# Patient Record
Sex: MALE | Race: WHITE | NOT HISPANIC OR LATINO | Employment: OTHER | URBAN - METROPOLITAN AREA
[De-identification: names, ages, dates, MRNs, and addresses within clinical notes are randomized per-mention and may not be internally consistent; named-entity substitution may affect disease eponyms.]

---

## 2017-01-16 ENCOUNTER — TELEPHONE (OUTPATIENT)
Dept: SMOKING CESSATION | Facility: CLINIC | Age: 62
End: 2017-01-16

## 2017-01-31 ENCOUNTER — CLINICAL SUPPORT (OUTPATIENT)
Dept: SMOKING CESSATION | Facility: CLINIC | Age: 62
End: 2017-01-31
Payer: COMMERCIAL

## 2017-01-31 DIAGNOSIS — F17.200 NICOTINE DEPENDENCE: Primary | ICD-10-CM

## 2017-01-31 PROCEDURE — 99999 PR PBB SHADOW E&M-EST. PATIENT-LVL I: CPT | Mod: PBBFAC,,,

## 2017-01-31 PROCEDURE — 99404 PREV MED CNSL INDIV APPRX 60: CPT | Mod: S$GLB,,,

## 2017-01-31 RX ORDER — VARENICLINE TARTRATE 0.5 (11)-1
KIT ORAL
Qty: 1 PACKAGE | Refills: 0 | Status: SHIPPED | OUTPATIENT
Start: 2017-01-31 | End: 2017-02-16 | Stop reason: SDUPTHER

## 2017-02-05 RX ORDER — SIMVASTATIN 20 MG/1
TABLET, FILM COATED ORAL
Qty: 90 TABLET | Refills: 0 | Status: SHIPPED | OUTPATIENT
Start: 2017-02-05 | End: 2017-02-07 | Stop reason: SDUPTHER

## 2017-02-07 ENCOUNTER — OFFICE VISIT (OUTPATIENT)
Dept: FAMILY MEDICINE | Facility: CLINIC | Age: 62
End: 2017-02-07
Payer: COMMERCIAL

## 2017-02-07 VITALS
HEART RATE: 93 BPM | SYSTOLIC BLOOD PRESSURE: 124 MMHG | BODY MASS INDEX: 29.63 KG/M2 | HEIGHT: 71 IN | WEIGHT: 211.63 LBS | TEMPERATURE: 98 F | DIASTOLIC BLOOD PRESSURE: 78 MMHG | OXYGEN SATURATION: 96 %

## 2017-02-07 DIAGNOSIS — F17.200 SMOKER: ICD-10-CM

## 2017-02-07 DIAGNOSIS — I10 ESSENTIAL HYPERTENSION: Primary | ICD-10-CM

## 2017-02-07 DIAGNOSIS — E78.5 HYPERLIPIDEMIA, UNSPECIFIED HYPERLIPIDEMIA TYPE: ICD-10-CM

## 2017-02-07 PROCEDURE — 99999 PR PBB SHADOW E&M-EST. PATIENT-LVL III: CPT | Mod: PBBFAC,,, | Performed by: NURSE PRACTITIONER

## 2017-02-07 PROCEDURE — 3078F DIAST BP <80 MM HG: CPT | Mod: S$GLB,,, | Performed by: NURSE PRACTITIONER

## 2017-02-07 PROCEDURE — 99213 OFFICE O/P EST LOW 20 MIN: CPT | Mod: S$GLB,,, | Performed by: NURSE PRACTITIONER

## 2017-02-07 PROCEDURE — 3074F SYST BP LT 130 MM HG: CPT | Mod: S$GLB,,, | Performed by: NURSE PRACTITIONER

## 2017-02-07 RX ORDER — SIMVASTATIN 20 MG/1
20 TABLET, FILM COATED ORAL NIGHTLY
Qty: 90 TABLET | Refills: 1 | Status: SHIPPED | OUTPATIENT
Start: 2017-02-07 | End: 2017-06-08 | Stop reason: SDUPTHER

## 2017-02-07 RX ORDER — LOSARTAN POTASSIUM 100 MG/1
100 TABLET ORAL DAILY
Qty: 30 TABLET | Refills: 3 | Status: SHIPPED | OUTPATIENT
Start: 2017-02-07 | End: 2017-06-08 | Stop reason: SDUPTHER

## 2017-02-07 RX ORDER — ALBUTEROL SULFATE 90 UG/1
2 AEROSOL, METERED RESPIRATORY (INHALATION) EVERY 6 HOURS PRN
Qty: 8 G | Refills: 0 | Status: SHIPPED | OUTPATIENT
Start: 2017-02-07 | End: 2018-06-19

## 2017-02-07 NOTE — PROGRESS NOTES
This dictation has been generated using Dragon Dictation some phonetic errors may occur.     Ruben was seen today for hypertension and medication refill.    Diagnoses and all orders for this visit:    Essential hypertension  -     Comprehensive metabolic panel; Future  -     Lipid panel; Future  -     Urinalysis; Future    Hyperlipidemia, unspecified hyperlipidemia type    Smoker  -     CT Chest Lung Screening Low Dose; Future    Other orders  -     simvastatin (ZOCOR) 20 MG tablet; Take 1 tablet (20 mg total) by mouth every evening.  -     losartan (COZAAR) 100 MG tablet; Take 1 tablet (100 mg total) by mouth once daily.  -     albuterol 90 mcg/actuation inhaler; Inhale 2 puffs into the lungs every 6 (six) hours as needed for Wheezing or Shortness of Breath.      Patient Active Problem List    Diagnosis Date Noted    Smoker 02/07/2017    Hypertension 09/19/2012    Hyperlipidemia 09/19/2012    HTN (hypertension) 08/06/2012     Stable and controlled on ARB.       hypertension refill meds as above.  I will review and address accordingly.  Hyperlipidemia stable and controlled continue current therapy.  Refill med as above.  Smoker schedule risk factors with patient.  He understands that there might be a charge and would like to know how much.    Return in about 6 months (around 8/7/2017).      ________________________________________________________________  ________________________________________________________________        Chief Complaint   Patient presents with    Hypertension    Medication Refill     History of present illness  This 62 y.o. presents today for complaint of hypertension hyperlipidemia and smoker.  Patient is trying to quit smoking again.  He is currently on Chantix.  His blood pressure is been controlled.  Patient denies any side effects of the medicine.  No side effects to cholesterol medicine.  Patient does note wheezing.  Symptoms are present at night.  He notes a smoker cough.   Probably starting with some early COPD.  Also needs rule out of cancer due to history of smoking.  Review of systems  No fever chills or unintended weight changes or night sweats noted.  Patient denies chest pain or shortness of breath.  He does note wheezing.  Wheezing is at night.  Has not used med for symptoms.  No polyuria polydipsia  No rash or abnormal bruising noted    Past medical and social history reviewed.    Past Medical History   Diagnosis Date    Hyperlipidemia     Hypertension        Past Surgical History   Procedure Laterality Date    Appendectomy         Family History   Problem Relation Age of Onset    Heart disease Mother     Hyperlipidemia Mother     Rashes / Skin problems Mother        Social History     Social History    Marital status:      Spouse name: N/A    Number of children: N/A    Years of education: N/A     Social History Main Topics    Smoking status: Former Smoker     Packs/day: 1.50     Years: 50.00     Types: Cigarettes     Quit date: 3/10/2015    Smokeless tobacco: Current User    Alcohol use 0.0 oz/week     0 Standard drinks or equivalent per week    Drug use: None    Sexual activity: Yes     Partners: Female     Other Topics Concern    None     Social History Narrative       Current Outpatient Prescriptions   Medication Sig Dispense Refill    ketoconazole (NIZORAL) 2 % cream 2 %.  Cream Topical Every day      losartan (COZAAR) 100 MG tablet Take 1 tablet (100 mg total) by mouth once daily. 30 tablet 3    nicotine (NICODERM CQ) 7 mg/24 hr Place 1 patch onto the skin once daily. 14 patch 0    nicotine (NICOTROL) 10 mg Crtg Inhale 1 puff into the lungs as needed (1 puff, as needed). 168 each 0    sildenafil (VIAGRA) 100 MG tablet Take 1 tablet (100 mg total) by mouth daily as needed for Erectile Dysfunction. 10 tablet 3    simvastatin (ZOCOR) 20 MG tablet Take 1 tablet (20 mg total) by mouth every evening. 90 tablet 1    varenicline (CHANTIX STARTING  MONTH BOX) 0.5 mg (11)- 1 mg (42) tablet Take one 0.5mg tab by mouth once daily X3 days,then increase to one 0.5mg tab twice daily X4 days,then increase to one 1mg tab twice daily 1 Package 0    albuterol 90 mcg/actuation inhaler Inhale 2 puffs into the lungs every 6 (six) hours as needed for Wheezing or Shortness of Breath. 8 g 0    paroxetine (PAXIL) 20 MG tablet Take 1 tablet (20 mg total) by mouth once daily. 30 tablet 6     No current facility-administered medications for this visit.        Review of patient's allergies indicates:   Allergen Reactions    Penicillins      Other reaction(s): Hives,itching       Physical examination  Vitals Reviewed  Gen. Well-dressed well-nourished no apparent distress  Skin warm dry and intact.  No rashes noted.  HEENT.  TM intact bilateral with normal light reflex.  No mastoid tenderness during percussion.  Nares patent bilateral.  Pharynx is unremarkable.  No maxillary or frontal sinus tenderness when percussed.    Neck is supple without adenopathy  Chest.  Respirations are even unlabored.  Lungs are clear to auscultation.  Cardiac regular rate and rhythm.  No chest wall adenopathy noted.  Neuro. Awake alert oriented x4.  Normal judgment and cognition noted.  Extremities no clubbing cyanosis or edema noted.     Call or return to clinic prn if these symptoms worsen or fail to improve as anticipated.

## 2017-02-07 NOTE — MR AVS SNAPSHOT
[facility]  [address] [place] [address]  Phone: [phone]  Fax: [phone]                  Ruben Cardenas   2017 10:40 AM   Office Visit    Description:  Male : 1955   Provider:  Wilbert Mcclellan NP   Department:  [facility]           Reason for Visit     Hypertension     Medication Refill           Diagnoses this Visit        Comments    Essential hypertension    -  Primary     Hyperlipidemia, unspecified hyperlipidemia type         Smoker                To Do List           Future Appointments        Provider Department Dept Phone    2017 7:00 AM LAB, [place] [phone]    2017 7:15 AM LAB, [place] [facility] [phone]      Goals (5 Years of Data)     None      Follow-Up and Disposition     Return in about 6 months (around 2017).       These Medications        Disp Refills Start End    simvastatin (ZOCOR) 20 MG tablet 90 tablet 1 2017     Take 1 tablet (20 mg total) by mouth every evening. - Oral    Pharmacy: [facility] [address] [place] [facility] Ph #: [phone]       losartan (COZAAR) 100 MG tablet 30 tablet 3 2017     Take 1 tablet (100 mg total) by mouth once daily. - Oral    Pharmacy: [facility] [address] [place] [facility] Ph #: [phone]       albuterol 90 mcg/actuation inhaler 8 g 0 2017    Inhale 2 puffs into the lungs every 6 (six) hours as needed for Wheezing or Shortness of Breath. - Inhalation    Pharmacy: [facility] [address] [place] [facility] Ph #: [phone]         [facility] On Call     [facility] On Call Nurse Care Line -  Assistance  Registered nurses in the [facility] On Call Center provide clinical advisement, health education, appointment booking, and other advisory  services.  Call for this free service at [phone].             Medications           Message regarding Medications     Verify the changes and/or additions to your medication regime listed below are the same as discussed with your clinician today.  If any of these changes or additions are incorrect, please notify your healthcare provider.        START taking these NEW medications        Refills    albuterol 90 mcg/actuation inhaler 0    Sig: Inhale 2 puffs into the lungs every 6 (six) hours as needed for Wheezing or Shortness of Breath.    Class: Normal    Route: Inhalation      CHANGE how you are taking these medications     Start Taking Instead of    simvastatin (ZOCOR) 20 MG tablet simvastatin (ZOCOR) 20 MG tablet    Dosage:  Take 1 tablet (20 mg total) by mouth every evening. Dosage:  TAKE 1 TABLET BY MOUTH EVERY EVENING    Reason for Change:  Reorder     losartan (COZAAR) 100 MG tablet losartan (COZAAR) 100 MG tablet    Dosage:  Take 1 tablet (100 mg total) by mouth once daily. Dosage:  TAKE 1 TABLET BY MOUTH EVERY DAY    Reason for Change:  Reorder            Verify that the below list of medications is an accurate representation of the medications you are currently taking.  If none reported, the list may be blank. If incorrect, please contact your healthcare provider. Carry this list with you in case of emergency.           Current Medications     ketoconazole (NIZORAL) 2 % cream 2 %.  Cream Topical Every day    losartan (COZAAR) 100 MG tablet Take 1 tablet (100 mg total) by mouth once daily.    nicotine (NICODERM CQ) 7 mg/24 hr Place 1 patch onto the skin once daily.    nicotine (NICOTROL) 10 mg Crtg Inhale 1 puff into the lungs as needed (1 puff, as needed).    sildenafil (VIAGRA) 100 MG tablet Take 1 tablet (100 mg total) by mouth daily as needed for Erectile Dysfunction.    simvastatin (ZOCOR) 20 MG tablet Take 1 tablet (20 mg total) by mouth every evening.    varenicline (CHANTIX STARTING MONTH BOX)  "0.5 mg (11)- 1 mg (42) tablet Take one 0.5mg tab by mouth once daily X3 days,then increase to one 0.5mg tab twice daily X4 days,then increase to one 1mg tab twice daily    albuterol 90 mcg/actuation inhaler Inhale 2 puffs into the lungs every 6 (six) hours as needed for Wheezing or Shortness of Breath.    paroxetine (PAXIL) 20 MG tablet Take 1 tablet (20 mg total) by mouth once daily.           Clinical Reference Information           Your Vitals Were     BP Pulse Temp Height Weight SpO2    124/78 (BP Location: Right arm, Patient Position: Sitting, BP Method: Manual) 93 98 °F (36.7 °C) (Oral) 5' 11" (1.803 m) 96 kg (211 lb 10.3 oz) 96%    BMI                29.52 kg/m2          Blood Pressure          Most Recent Value    BP  124/78      Allergies as of 2/7/2017     Penicillins      Immunizations Administered on Date of Encounter - 2/7/2017     None      Orders Placed During Today's Visit     Future Labs/Procedures Expected by Expires    Comprehensive metabolic panel  2/7/2017 5/8/2017    CT Chest Lung Screening Low Dose  2/7/2017 2/7/2018    Lipid panel  2/7/2017 5/8/2017    Urinalysis  2/7/2017 4/8/2018      [facility] Sign-Up     Activating your [facility] account is as easy as 1-2-3!     1) Visit [email or web address], select Sign Up Now, enter this activation code and your date of birth, then select Next.  [email or web address]  Expires: 3/24/2017 11:12 AM      2) Create a username and password to use when you visit [facility] in the future and select a security question in case you lose your password and select Next.    3) Enter your e-mail address and click Sign Up!    Additional Information  If you have questions, please e-mail [email or web address] or call [phone] to talk to our [facility] staff. Remember, [facility] is NOT to be used for urgent needs. For medical emergencies, dial 911.         Language Assistance Services     ATTENTION: Language assistance services are available, free of charge. Please call " [phone].      ATENCIÓN: Si habla español, tiene a lal disposición servicios gratuitos de asistencia lingüística. Llame al [phone].     CHÚ Ý: N?u b?n nói Ti?ng Vi?t, có các d?ch v? h? tr? ngôn ng? mi?n phí dành cho b?n. G?i s? [phone].         [facility] complies with applicable Federal civil rights laws and does not discriminate on the basis of race, color, national origin, age, disability, or sex.

## 2017-02-08 ENCOUNTER — LAB VISIT (OUTPATIENT)
Dept: LAB | Facility: HOSPITAL | Age: 62
End: 2017-02-08
Attending: NURSE PRACTITIONER
Payer: COMMERCIAL

## 2017-02-08 ENCOUNTER — TELEPHONE (OUTPATIENT)
Dept: FAMILY MEDICINE | Facility: CLINIC | Age: 62
End: 2017-02-08

## 2017-02-08 DIAGNOSIS — I10 ESSENTIAL HYPERTENSION: ICD-10-CM

## 2017-02-14 ENCOUNTER — CLINICAL SUPPORT (OUTPATIENT)
Dept: SMOKING CESSATION | Facility: CLINIC | Age: 62
End: 2017-02-14
Payer: COMMERCIAL

## 2017-02-14 DIAGNOSIS — F17.200 NICOTINE DEPENDENCE: Primary | ICD-10-CM

## 2017-02-14 PROCEDURE — 99406 BEHAV CHNG SMOKING 3-10 MIN: CPT | Mod: S$GLB,,,

## 2017-02-16 ENCOUNTER — CLINICAL SUPPORT (OUTPATIENT)
Dept: SMOKING CESSATION | Facility: CLINIC | Age: 62
End: 2017-02-16
Payer: COMMERCIAL

## 2017-02-16 DIAGNOSIS — F17.200 NICOTINE DEPENDENCE: ICD-10-CM

## 2017-02-16 PROCEDURE — 99999 PR PBB SHADOW E&M-EST. PATIENT-LVL I: CPT | Mod: PBBFAC,,,

## 2017-02-16 PROCEDURE — 99402 PREV MED CNSL INDIV APPRX 30: CPT | Mod: S$GLB,,,

## 2017-02-16 RX ORDER — VARENICLINE TARTRATE 1 MG/1
1 TABLET, FILM COATED ORAL 2 TIMES DAILY
Qty: 56 TABLET | Refills: 0 | Status: SHIPPED | OUTPATIENT
Start: 2017-02-16 | End: 2017-03-16

## 2017-02-16 RX ORDER — VARENICLINE TARTRATE 0.5 (11)-1
KIT ORAL
Qty: 1 PACKAGE | Refills: 0 | Status: SHIPPED | OUTPATIENT
Start: 2017-02-16 | End: 2017-02-16 | Stop reason: CLARIF

## 2017-02-16 NOTE — PROGRESS NOTES
Individual Follow-Up Form    2/16/2017    Quit Date: 2/2/17    Clinical Status of Patient: Outpatient    Length of Service: 30 minutes    Continuing Medication: yes  Chantix    Other Medications: n/a     Target Symptoms: Withdrawal and medication side effects. The following were  rated moderate (3) to severe (4) on TCRS:  · Moderate (3): 0  · Severe (4): 0    Comments: pt presents as a non smoker, pt quit 2/2 and has not had any slips, he is currently on chantix po bid and doing well, will have patient continue chantix x 2 more months following starter pack to prevent lapse, he is fighting the urge and doing well, will continue to follow and monitor while taking chantix, session # 1 handout provided to the patient and discussed, contact card provided to the patient     Diagnosis: F17.210    Next Visit: 2 weeks

## 2017-02-16 NOTE — Clinical Note
pt presents as a non smoker, pt quit 2/2 and has not had any slips, he is currently on chantix po bid and doing well, will have patient continue chantix x 2 more months following starter pack to prevent lapse, he is fighting the urge and doing well, will continue to follow and monitor while taking chantix

## 2017-05-05 RX ORDER — SIMVASTATIN 20 MG/1
TABLET, FILM COATED ORAL
Qty: 90 TABLET | Refills: 0 | Status: SHIPPED | OUTPATIENT
Start: 2017-05-05 | End: 2017-06-08 | Stop reason: SDUPTHER

## 2017-06-08 ENCOUNTER — OFFICE VISIT (OUTPATIENT)
Dept: FAMILY MEDICINE | Facility: CLINIC | Age: 62
End: 2017-06-08
Payer: COMMERCIAL

## 2017-06-08 VITALS
HEIGHT: 71 IN | HEART RATE: 100 BPM | TEMPERATURE: 98 F | BODY MASS INDEX: 30.14 KG/M2 | WEIGHT: 215.25 LBS | OXYGEN SATURATION: 98 % | DIASTOLIC BLOOD PRESSURE: 76 MMHG | SYSTOLIC BLOOD PRESSURE: 114 MMHG

## 2017-06-08 DIAGNOSIS — E78.5 HYPERLIPIDEMIA, UNSPECIFIED HYPERLIPIDEMIA TYPE: ICD-10-CM

## 2017-06-08 DIAGNOSIS — R73.09 ABNORMAL GLUCOSE: ICD-10-CM

## 2017-06-08 DIAGNOSIS — Z23 NEED FOR SHINGLES VACCINE: ICD-10-CM

## 2017-06-08 DIAGNOSIS — J30.9 ALLERGIC RHINITIS, UNSPECIFIED ALLERGIC RHINITIS TRIGGER, UNSPECIFIED RHINITIS SEASONALITY: ICD-10-CM

## 2017-06-08 DIAGNOSIS — Z12.2 ENCOUNTER FOR SCREENING FOR LUNG CANCER: ICD-10-CM

## 2017-06-08 DIAGNOSIS — I10 ESSENTIAL HYPERTENSION: Primary | ICD-10-CM

## 2017-06-08 PROCEDURE — 99999 PR PBB SHADOW E&M-EST. PATIENT-LVL III: CPT | Mod: PBBFAC,,, | Performed by: INTERNAL MEDICINE

## 2017-06-08 PROCEDURE — 99214 OFFICE O/P EST MOD 30 MIN: CPT | Mod: S$GLB,,, | Performed by: INTERNAL MEDICINE

## 2017-06-08 RX ORDER — FLUTICASONE PROPIONATE 50 MCG
1 SPRAY, SUSPENSION (ML) NASAL DAILY
Qty: 1 BOTTLE | Refills: 11 | Status: SHIPPED | OUTPATIENT
Start: 2017-06-08 | End: 2018-06-19

## 2017-06-08 RX ORDER — SIMVASTATIN 20 MG/1
20 TABLET, FILM COATED ORAL NIGHTLY
Qty: 90 TABLET | Refills: 3 | Status: SHIPPED | OUTPATIENT
Start: 2017-06-08 | End: 2018-04-26 | Stop reason: SDUPTHER

## 2017-06-08 RX ORDER — LOSARTAN POTASSIUM 100 MG/1
100 TABLET ORAL DAILY
Qty: 90 TABLET | Refills: 3 | Status: SHIPPED | OUTPATIENT
Start: 2017-06-08 | End: 2018-04-26 | Stop reason: SDUPTHER

## 2017-06-08 NOTE — PATIENT INSTRUCTIONS
A1C  Does this test have other names?  Hemoglobin A1c; HbA1c; glycosylated hemoglobin; glycohemoglobin; Glycated hemoglobin  What is this test?  A1C is a blood test used to screen people to find out whether they have diabetes or prediabetes. It's also used in people who know they have diabetes to measure how well they are controlling their blood sugar and to guide their treatment decisions over time.  Why do I need this test?  You may need this test to check for prediabetes or diabetes. If you already know that you have diabetes or prediabetes, you may need this test to see how well you are controlling your blood sugar.  People with diabetes must track their blood sugar (glucose) levels every day to make sure they arent too high or too low. The A1C test gives results for a longer period of time. It shows whether your blood sugar has been too high on average in the previous two to three months. When blood sugar is high, more glucose builds up and sticks to your hemoglobin, a protein that carries oxygen in red blood cells. The A1C test measures the percentage of hemoglobin that is coated with blood sugar.  Depending on the type of diabetes you have, how well it's controlled, and your health care providers preferences, you may need to have the A1C test two or more times a year. The American Diabetes Association (ADA) recommends that you have an A1C test at least twice a year if you are meeting your blood sugar goals and your blood sugar is well-controlled. If you arent meeting your goals or your medication has changed lately, you should have the A1C test more often. You also may have the test when your health care provider first starts working with you to treat your diabetes.  What other tests might I have along with this test?   If your health care provider is testing you for diabetes, you may also take a fasting plasma glucose test, or FPG, or an oral glucose tolerance test, or OGTT, as part of your screening  and diagnosis. You may also be tested for sugar, ketones or protein in the urine.  What do my test results mean?  Laboratory test results may vary depending on your age, gender, health history, the method used for the test, and many other factors. If your results are different from the results suggested below, this may not mean that you have a problem. Ask your health care provider to explain what the results mean for you.   A1C is reported as a percentage:  · Normal A1C is considered to be below 5.7 percent. Results between 5.7 and 6.4 percent may mean you have prediabetes. This means you have a higher risk of developing diabetes.  · Results of 6.5 percent or higher on two separate occasions may mean that you have diabetes.  · The ADA  recommends that people with diabetes should maintain an A1C below 7 percent. The American Association of Clinical Endocrinologists recommends an A1C of 6.5 percent or less. Recommendations may vary based on the person's age, medical conditions, or other factors.   How is this test done?  The test requires a blood sample, which is drawn through a needle from a vein in your arm.   Does this test pose any risks?  Taking a blood sample with a needle carries risks that include bleeding, infection, bruising, and a sense of lightheadedness. When the needle pricks your arm, you may feel a slight stinging sensation or pain. Afterward, the site may be slightly sore.  What might affect my test results?  Your blood sugar levels usually vary throughout the day. These variations won't affect the A1C.  If you have sickle cell anemia or other blood disorders, a standard A1C test may be less useful for diagnosing or monitoring diabetes. (Your health care provider may be able to find another diabetes test that will better serve you.) In addition, the test results may be skewed if you have anemia, heavy bleeding, an iron deficiency, kidney failure, or liver disease.  How do I get ready for this  test?  You don't need any special preparation for the test.    Date Last Reviewed: 5/15/2015  © 6092-1374 [employer]. [address] [place] [address], [place] [address]. All rights reserved. This information is not intended as a substitute for professional medical care. Always follow your healthcare professional's instructions.

## 2017-06-08 NOTE — PROGRESS NOTES
Assessment & Plan  Essential hypertension - stable, refilled losartan  -     losartan (COZAAR) 100 MG tablet; Take 1 tablet (100 mg total) by mouth once daily.  Dispense: 90 tablet; Refill: 3    Encounter for screening for lung cancer-former smoker, less than 2 months.  Previously ordered CT chest, reorder today  -     CT Chest Lung Screening Low Dose; Future; Expected date: 06/08/2017    Hyperlipidemia, unspecified hyperlipidemia type-out of simvastatin for the past week, refilled  -     simvastatin (ZOCOR) 20 MG tablet; Take 1 tablet (20 mg total) by mouth every evening.  Dispense: 90 tablet; Refill: 3    Abnormal glucose-work on diet and exercise.  Gave him a handout about prediabetes.    Allergic rhinitis, unspecified allergic rhinitis trigger, unspecified rhinitis seasonality-refilled Flonase  -     fluticasone (FLONASE) 50 mcg/actuation nasal spray; 1 spray by Each Nare route once daily.  Dispense: 1 Bottle; Refill: 11    Need for shingles vaccine  -     zoster vaccine live, PF, (ZOSTAVAX, PF,) 19,400 unit/0.65 mL injection; Inject 19,400 Units into the skin once.  Dispense: 1 vial; Refill: 0    History of depression, no longer requiring Paxil.    Medications Discontinued During This Encounter   Medication Reason    nicotine (NICODERM CQ) 7 mg/24 hr Therapy completed    nicotine (NICOTROL) 10 mg Crtg Therapy completed    paroxetine (PAXIL) 20 MG tablet Therapy not effective    simvastatin (ZOCOR) 20 MG tablet Duplicate Order    sildenafil (VIAGRA) 100 MG tablet Therapy completed    ketoconazole (NIZORAL) 2 % cream Therapy completed       Follow-up: No Follow-up on file. fasting follow-up 6 months, A1c, lipid.  Recall entered      =================================================================      Chief Complaint   Patient presents with    Medication Refill       SERGE Miranda is a 62 y.o. male, last appointment with this clinic was 2/7/2017.    HTN; has home cuff.  Cannot recall numbers - wife checks.   Notes pressure higher in the morning.   Hyperlipidemia. Ran out of the simva x 1 week.   Smoker - quit 2 months ago!  Shola Mccllelan had ordered CT lung screening 2/2017.  Hx of chantix.  Hx of enrollment smoking cessation classes. Intermittent use of inhaler.    Hx of depression, paxil.  Stopped about 2 years ago.  Colonoscopy due 2019 by report.  Pre-DM with hx of a1c 6.0.    Never got the lung CT scan - was too expensive; however he's changed insurance companies.  Recalls having it done at a Clinic in [place] but unclear if it's CXR or CT scan.    Eats a lot of rice.  Not a sweet tooth.    Physical activity - with work.    Patient Care Team:  Charles Hwang MD as PCP - General (Internal Medicine)    Patient Active Problem List    Diagnosis Date Noted    Smoker 02/07/2017    Hypertension 09/19/2012    Hyperlipidemia 09/19/2012    HTN (hypertension) 08/06/2012     Stable and controlled on ARB.          PAST MEDICAL HISTORY:  Past Medical History:   Diagnosis Date    Hyperlipidemia     Hypertension        PAST SURGICAL HISTORY:  Past Surgical History:   Procedure Laterality Date    APPENDECTOMY       Family History   Problem Relation Age of Onset    Heart disease Mother     Hyperlipidemia Mother     Rashes / Skin problems Mother     COPD Sister      smoker    Alcohol abuse Brother     Hypertension Sister     No Known Problems Sister     No Known Problems Son     No Known Problems Daughter        SOCIAL HISTORY:  Social History     Social History    Marital status:      Spouse name: N/A    Number of children: N/A    Years of education: N/A     Occupational History    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  Review of patient's allergies indicates:   Allergen Reactions    Penicillins      Other reaction(s): Hives,itching     Current Outpatient Prescriptions   Medication Sig Dispense Refill    losartan (COZAAR) 100 MG tablet Take 1 tablet (100 mg total) by mouth once daily. 30 tablet 3    simvastatin (ZOCOR) 20 MG tablet Take 1 tablet (20 mg total) by mouth every evening. 90 tablet 1    albuterol 90 mcg/actuation inhaler Inhale 2 puffs into the lungs every 6 (six) hours as needed for Wheezing or Shortness of Breath. 8 g 0     No current facility-administered medications for this visit.        Review of Systems   Constitutional: Negative for chills and fever.   Respiratory: Negative for cough and shortness of breath.    Cardiovascular: Negative for chest pain and leg swelling.       Physical Exam   Vitals:    06/08/17 1343   BP: 114/76   Pulse: 100   Temp: 98 °F (36.7 °C)   SpO2: 98%   Weight: 97.7 kg (215 lb 4.5 oz)   Height: 5' 11" (1.803 m)    Body mass index is 30.03 kg/m².  Weight: 97.7 kg (215 lb 4.5 oz)   Height: 5' 11" (180.3 cm)     Physical Exam   Constitutional: He is oriented to person, place, and time. He appears well-developed and well-nourished. No distress.   Eyes: EOM are normal.   Cardiovascular: Normal rate, regular rhythm and normal heart sounds.    No murmur heard.  Pulmonary/Chest: Effort normal and breath sounds normal.   Musculoskeletal: Normal range of motion.   Neurological: He is alert and oriented to person, place, and time. Coordination normal.   Skin: Skin is warm and dry.   Psychiatric: He has a normal mood and affect. His behavior is normal. Thought content normal.     "

## 2017-07-19 ENCOUNTER — CLINICAL SUPPORT (OUTPATIENT)
Dept: SMOKING CESSATION | Facility: CLINIC | Age: 62
End: 2017-07-19
Payer: COMMERCIAL

## 2017-07-19 DIAGNOSIS — F17.200 NICOTINE DEPENDENCE: Primary | ICD-10-CM

## 2017-07-19 PROCEDURE — 99407 BEHAV CHNG SMOKING > 10 MIN: CPT | Mod: S$GLB,,,

## 2018-01-11 ENCOUNTER — CLINICAL SUPPORT (OUTPATIENT)
Dept: SMOKING CESSATION | Facility: CLINIC | Age: 63
End: 2018-01-11
Payer: COMMERCIAL

## 2018-01-11 DIAGNOSIS — F17.200 NICOTINE DEPENDENCE: Primary | ICD-10-CM

## 2018-01-11 PROCEDURE — 99407 BEHAV CHNG SMOKING > 10 MIN: CPT | Mod: S$GLB,,,

## 2018-01-22 ENCOUNTER — CLINICAL SUPPORT (OUTPATIENT)
Dept: SMOKING CESSATION | Facility: CLINIC | Age: 63
End: 2018-01-22
Payer: COMMERCIAL

## 2018-01-22 DIAGNOSIS — F17.200 NICOTINE DEPENDENCE: Primary | ICD-10-CM

## 2018-01-22 PROCEDURE — 99999 PR PBB SHADOW E&M-EST. PATIENT-LVL I: CPT | Mod: PBBFAC,,,

## 2018-01-22 PROCEDURE — 99404 PREV MED CNSL INDIV APPRX 60: CPT | Mod: S$GLB,,,

## 2018-01-22 RX ORDER — VARENICLINE TARTRATE 0.5 (11)-1
KIT ORAL
Qty: 1 PACKAGE | Refills: 0 | Status: SHIPPED | OUTPATIENT
Start: 2018-01-22 | End: 2018-06-19

## 2018-01-22 RX ORDER — IBUPROFEN 200 MG
1 TABLET ORAL DAILY
Qty: 14 PATCH | Refills: 0 | Status: SHIPPED | OUTPATIENT
Start: 2018-01-22 | End: 2018-02-22

## 2018-03-16 DIAGNOSIS — Z13.6 SCREENING FOR AAA (AORTIC ABDOMINAL ANEURYSM): ICD-10-CM

## 2018-04-11 ENCOUNTER — CLINICAL SUPPORT (OUTPATIENT)
Dept: SMOKING CESSATION | Facility: CLINIC | Age: 63
End: 2018-04-11
Payer: COMMERCIAL

## 2018-04-11 DIAGNOSIS — F17.200 NICOTINE DEPENDENCE: Primary | ICD-10-CM

## 2018-04-11 PROCEDURE — 99407 BEHAV CHNG SMOKING > 10 MIN: CPT | Mod: S$GLB,,, | Performed by: INTERNAL MEDICINE

## 2018-04-11 NOTE — PROGRESS NOTES
Spoke with patient today regarding smoking cessation, he states not tobacco free. Patient states he changed insurance and they won't pay for the Chantix. Expressed understanding, and informed patient of other medications along with benefit period and contact number to return to the program. Patient's benefit end on 5/11/18 and start 7/19/18. Will complete the 3 month follow up on the smart form for Quit attempt #5.

## 2018-04-26 ENCOUNTER — LAB VISIT (OUTPATIENT)
Dept: LAB | Facility: HOSPITAL | Age: 63
End: 2018-04-26
Attending: INTERNAL MEDICINE
Payer: MEDICARE

## 2018-04-26 ENCOUNTER — OFFICE VISIT (OUTPATIENT)
Dept: FAMILY MEDICINE | Facility: CLINIC | Age: 63
End: 2018-04-26
Payer: MEDICARE

## 2018-04-26 ENCOUNTER — TELEPHONE (OUTPATIENT)
Dept: FAMILY MEDICINE | Facility: CLINIC | Age: 63
End: 2018-04-26

## 2018-04-26 VITALS
TEMPERATURE: 98 F | SYSTOLIC BLOOD PRESSURE: 130 MMHG | WEIGHT: 204.56 LBS | HEART RATE: 98 BPM | BODY MASS INDEX: 28.64 KG/M2 | OXYGEN SATURATION: 96 % | HEIGHT: 71 IN | DIASTOLIC BLOOD PRESSURE: 86 MMHG

## 2018-04-26 DIAGNOSIS — R01.1 CARDIAC MURMUR: ICD-10-CM

## 2018-04-26 DIAGNOSIS — E78.5 HYPERLIPIDEMIA, UNSPECIFIED HYPERLIPIDEMIA TYPE: ICD-10-CM

## 2018-04-26 DIAGNOSIS — R73.9 HYPERGLYCEMIA: Primary | ICD-10-CM

## 2018-04-26 DIAGNOSIS — I10 ESSENTIAL HYPERTENSION: ICD-10-CM

## 2018-04-26 DIAGNOSIS — R73.9 HYPERGLYCEMIA: ICD-10-CM

## 2018-04-26 LAB
ALBUMIN SERPL BCP-MCNC: 3.8 G/DL
ALP SERPL-CCNC: 92 U/L
ALT SERPL W/O P-5'-P-CCNC: 16 U/L
ANION GAP SERPL CALC-SCNC: 10 MMOL/L
AST SERPL-CCNC: 13 U/L
BASOPHILS # BLD AUTO: 0.06 K/UL
BASOPHILS NFR BLD: 0.6 %
BILIRUB SERPL-MCNC: 0.7 MG/DL
BUN SERPL-MCNC: 9 MG/DL
CALCIUM SERPL-MCNC: 9.9 MG/DL
CHLORIDE SERPL-SCNC: 105 MMOL/L
CHOLEST SERPL-MCNC: 158 MG/DL
CHOLEST/HDLC SERPL: 4 {RATIO}
CO2 SERPL-SCNC: 24 MMOL/L
CREAT SERPL-MCNC: 1 MG/DL
DIFFERENTIAL METHOD: ABNORMAL
EOSINOPHIL # BLD AUTO: 0.2 K/UL
EOSINOPHIL NFR BLD: 1.8 %
ERYTHROCYTE [DISTWIDTH] IN BLOOD BY AUTOMATED COUNT: 15.5 %
EST. GFR  (AFRICAN AMERICAN): >60 ML/MIN/1.73 M^2
EST. GFR  (NON AFRICAN AMERICAN): >60 ML/MIN/1.73 M^2
ESTIMATED AVG GLUCOSE: 111 MG/DL
GLUCOSE SERPL-MCNC: 107 MG/DL
HBA1C MFR BLD HPLC: 5.5 %
HCT VFR BLD AUTO: 45.3 %
HDLC SERPL-MCNC: 40 MG/DL
HDLC SERPL: 25.3 %
HGB BLD-MCNC: 14.5 G/DL
IMM GRANULOCYTES # BLD AUTO: 0.05 K/UL
IMM GRANULOCYTES NFR BLD AUTO: 0.5 %
LDLC SERPL CALC-MCNC: 97.2 MG/DL
LYMPHOCYTES # BLD AUTO: 2.2 K/UL
LYMPHOCYTES NFR BLD: 23.3 %
MCH RBC QN AUTO: 31 PG
MCHC RBC AUTO-ENTMCNC: 32 G/DL
MCV RBC AUTO: 97 FL
MONOCYTES # BLD AUTO: 0.6 K/UL
MONOCYTES NFR BLD: 6.6 %
NEUTROPHILS # BLD AUTO: 6.3 K/UL
NEUTROPHILS NFR BLD: 67.2 %
NONHDLC SERPL-MCNC: 118 MG/DL
NRBC BLD-RTO: 0 /100 WBC
PLATELET # BLD AUTO: 255 K/UL
PMV BLD AUTO: 10.3 FL
POTASSIUM SERPL-SCNC: 4.3 MMOL/L
PROT SERPL-MCNC: 8.3 G/DL
RBC # BLD AUTO: 4.67 M/UL
SODIUM SERPL-SCNC: 139 MMOL/L
TRIGL SERPL-MCNC: 104 MG/DL
TSH SERPL DL<=0.005 MIU/L-ACNC: 2.17 UIU/ML
WBC # BLD AUTO: 9.38 K/UL

## 2018-04-26 PROCEDURE — 99213 OFFICE O/P EST LOW 20 MIN: CPT | Mod: PBBFAC,PO | Performed by: NURSE PRACTITIONER

## 2018-04-26 PROCEDURE — 36415 COLL VENOUS BLD VENIPUNCTURE: CPT | Mod: PO

## 2018-04-26 PROCEDURE — 80061 LIPID PANEL: CPT

## 2018-04-26 PROCEDURE — 80053 COMPREHEN METABOLIC PANEL: CPT

## 2018-04-26 PROCEDURE — 83036 HEMOGLOBIN GLYCOSYLATED A1C: CPT

## 2018-04-26 PROCEDURE — 84443 ASSAY THYROID STIM HORMONE: CPT

## 2018-04-26 PROCEDURE — 99215 OFFICE O/P EST HI 40 MIN: CPT | Mod: S$GLB,,, | Performed by: NURSE PRACTITIONER

## 2018-04-26 PROCEDURE — 85025 COMPLETE CBC W/AUTO DIFF WBC: CPT

## 2018-04-26 PROCEDURE — 99999 PR PBB SHADOW E&M-EST. PATIENT-LVL III: CPT | Mod: PBBFAC,,, | Performed by: NURSE PRACTITIONER

## 2018-04-26 RX ORDER — SIMVASTATIN 20 MG/1
20 TABLET, FILM COATED ORAL NIGHTLY
Qty: 90 TABLET | Refills: 3 | Status: SHIPPED | OUTPATIENT
Start: 2018-04-26 | End: 2019-04-08 | Stop reason: SDUPTHER

## 2018-04-26 RX ORDER — LOSARTAN POTASSIUM 100 MG/1
100 TABLET ORAL DAILY
Qty: 90 TABLET | Refills: 3 | Status: SHIPPED | OUTPATIENT
Start: 2018-04-26 | End: 2018-07-19 | Stop reason: ALTCHOICE

## 2018-04-26 NOTE — PROGRESS NOTES
This dictation has been generated using Dragon Dictation some phonetic errors may occur.     Problem List Items Addressed This Visit     HTN (hypertension)    Overview     Stable and controlled on ARB.          Relevant Medications    losartan (COZAAR) 100 MG tablet    Other Relevant Orders    CBC auto differential (Completed)    Comprehensive metabolic panel (Completed)    Lipid panel (Completed)    Urinalysis    TSH (Completed)    X-Ray Chest PA And Lateral    Hyperlipidemia    Relevant Medications    simvastatin (ZOCOR) 20 MG tablet      Other Visit Diagnoses     Hyperglycemia    -  Primary    Relevant Orders    Hemoglobin A1c (Completed)    Cardiac murmur        Relevant Orders    2D echo with color flow doppler          Orders Placed This Encounter    X-Ray Chest PA And Lateral    CBC auto differential    Comprehensive metabolic panel    Hemoglobin A1c    Lipid panel    Urinalysis    TSH    2D echo with color flow doppler    losartan (COZAAR) 100 MG tablet    simvastatin (ZOCOR) 20 MG tablet     Hypertension stable and controlled.  Continue current therapy.  Update labs as above.  I'll review and address accordingly.  Hyperlipidemia continue simvastatin.  Hyperglycemia check A1c.      He does have a cardiac murmur check 2-D echo and evaluate for any aortic stenosis.      Other health maintenance up-to-date.    Follow-up if symptoms worsen or fail to improve.    ________________________________________________________________  ________________________________________________________________      Chief Complaint   Patient presents with    Annual Exam    Medication Refill     History of present illness  This 63 y.o. presents today for complaint of physical exam and refills.  Patient has hypertension hyperlipidemia and hyperglycemia and a murmur.  Taking meds as directed.  He has managed to cut back on the smoking.  He had quit briefly.  He remains physically active.  Doesn't follow any particular  diet.  Review of systems  No fever chills or unintended weight change  No chest pain or shortness of breath  No nausea vomiting or diarrhea  No polyuria polydipsia  No rash        Past Medical History:   Diagnosis Date    Hyperlipidemia     Hypertension        Past Surgical History:   Procedure Laterality Date    APPENDECTOMY         Family History   Problem Relation Age of Onset    Heart disease Mother     Hyperlipidemia Mother     Rashes / Skin problems Mother     COPD Sister      smoker    Alcohol abuse Brother     Hypertension Sister     No Known Problems Sister     No Known Problems Son     No Known Problems Daughter        Social History     Social History    Marital status:      Spouse name: N/A    Number of children: N/A    Years of education: N/A     Occupational History    guard at the [employer]      Social History Main Topics    Smoking status: Former Smoker     Packs/day: 1.50     Years: 50.00     Types: Cigarettes     Quit date: 2/2/2017    Smokeless tobacco: Current User    Alcohol use 0.0 oz/week    Drug use: Unknown    Sexual activity: Yes     Partners: Female     Other Topics Concern    None     Social History Narrative    None       Current Outpatient Prescriptions   Medication Sig Dispense Refill    fluticasone (FLONASE) 50 mcg/actuation nasal spray 1 spray by Each Nare route once daily. 1 Bottle 11    losartan (COZAAR) 100 MG tablet Take 1 tablet (100 mg total) by mouth once daily. 90 tablet 3    simvastatin (ZOCOR) 20 MG tablet Take 1 tablet (20 mg total) by mouth every evening. 90 tablet 3    varenicline (CHANTIX STARTING MONTH BOX) 0.5 mg (11)- 1 mg (42) tablet Take one 0.5mg tab by mouth once daily X3 days,then increase to one 0.5mg tab twice daily X4 days,then increase to one 1mg tab twice daily 1 Package 0    albuterol 90 mcg/actuation inhaler Inhale 2 puffs into the lungs every 6 (six) hours as needed for Wheezing or Shortness of Breath. 8 g 0      No current facility-administered medications for this visit.        Review of patient's allergies indicates:   Allergen Reactions    Penicillins      Other reaction(s): Hives,itching       Physical examination  Vitals Reviewed  Gen. Well-dressed well-nourished no apparent distress  Skin warm dry and intact.  No rashes noted.  Neck is supple without adenopathy  Chest.  Respirations are even unlabored.  Lungs are clear to auscultation.  Cardiac regular rate and rhythm.  2/6 systolic murmur heard best left sternal border.  Does not radiate to the carotids.  No chest wall adenopathy noted.  Neuro. Awake alert oriented x4.  Normal judgment and cognition noted.  Extremities no clubbing cyanosis or edema noted.     Call or return to clinic prn if these symptoms worsen or fail to improve as anticipated.

## 2018-05-25 ENCOUNTER — HOSPITAL ENCOUNTER (OUTPATIENT)
Dept: RADIOLOGY | Facility: HOSPITAL | Age: 63
Discharge: HOME | End: 2018-05-25
Attending: NURSE PRACTITIONER
Payer: MEDICARE

## 2018-05-25 ENCOUNTER — TELEPHONE (OUTPATIENT)
Dept: FAMILY MEDICINE | Facility: CLINIC | Age: 63
End: 2018-05-25

## 2018-05-25 ENCOUNTER — HOSPITAL ENCOUNTER (OUTPATIENT)
Dept: CARDIOLOGY | Facility: HOSPITAL | Age: 63
Discharge: HOME | End: 2018-05-25
Attending: NURSE PRACTITIONER
Payer: MEDICARE

## 2018-05-25 DIAGNOSIS — R01.1 CARDIAC MURMUR: ICD-10-CM

## 2018-05-25 DIAGNOSIS — I10 ESSENTIAL HYPERTENSION: ICD-10-CM

## 2018-05-25 LAB
AORTIC VALVE STENOSIS: ABNORMAL
DIASTOLIC DYSFUNCTION: YES
GLOBAL PERICARDIAL EFFUSION: ABNORMAL
MITRAL VALVE MOBILITY: NORMAL
RETIRED EF AND QEF - SEE NOTES: 35 (ref 55–65)

## 2018-05-25 PROCEDURE — 71046 X-RAY EXAM CHEST 2 VIEWS: CPT | Mod: TC,FY

## 2018-05-25 PROCEDURE — 71046 X-RAY EXAM CHEST 2 VIEWS: CPT | Mod: 26,,, | Performed by: RADIOLOGY

## 2018-05-25 PROCEDURE — 93306 TTE W/DOPPLER COMPLETE: CPT | Mod: 26,,, | Performed by: INTERNAL MEDICINE

## 2018-05-25 PROCEDURE — 93306 TTE W/DOPPLER COMPLETE: CPT

## 2018-05-29 ENCOUNTER — TELEPHONE (OUTPATIENT)
Dept: FAMILY MEDICINE | Facility: CLINIC | Age: 63
End: 2018-05-29

## 2018-05-29 DIAGNOSIS — R93.1 ABNORMAL ECHOCARDIOGRAM: Primary | ICD-10-CM

## 2018-05-29 NOTE — TELEPHONE ENCOUNTER
Covering results for Eleuterio who saw pt of Dr Hwang    Patient had echo    Call patient     The heart ultrasound shows that the aortic valve has some scar tissue on it which was causing the murmur that nurse Eleuterio heard.    The ultrasound also shows that the heart may be slightly weaker than normal and this typically requires a workup by cardiology and so a referral for to cardiology has been put in and someone will be calling you.

## 2018-06-04 ENCOUNTER — TELEPHONE (OUTPATIENT)
Dept: FAMILY MEDICINE | Facility: CLINIC | Age: 63
End: 2018-06-04

## 2018-06-04 DIAGNOSIS — I35.0 NONRHEUMATIC AORTIC VALVE STENOSIS: ICD-10-CM

## 2018-06-04 DIAGNOSIS — I50.22 CHRONIC SYSTOLIC HEART FAILURE: ICD-10-CM

## 2018-06-04 NOTE — TELEPHONE ENCOUNTER
His echo showed mild aortic stenosis which is a stiffness of the aortic valve.  It also shows that his heart function is down.  It's not squeezing normally. Hard to say how long it's been that way.      He needs to see cardiology for this and I will submit the referral.

## 2018-06-04 NOTE — TELEPHONE ENCOUNTER
Spoke w/patients wife informed of results and referral. States consult appt with cardiology is scheduled 6/13/18. Verbalized understanding.

## 2018-06-13 ENCOUNTER — OFFICE VISIT (OUTPATIENT)
Dept: CARDIOLOGY | Facility: CLINIC | Age: 63
End: 2018-06-13
Payer: MEDICARE

## 2018-06-13 VITALS
HEIGHT: 71 IN | HEART RATE: 104 BPM | BODY MASS INDEX: 29.94 KG/M2 | OXYGEN SATURATION: 98 % | WEIGHT: 213.88 LBS | DIASTOLIC BLOOD PRESSURE: 78 MMHG | SYSTOLIC BLOOD PRESSURE: 132 MMHG

## 2018-06-13 DIAGNOSIS — I10 HTN (HYPERTENSION): ICD-10-CM

## 2018-06-13 DIAGNOSIS — I50.22 CHRONIC SYSTOLIC HEART FAILURE: Primary | ICD-10-CM

## 2018-06-13 DIAGNOSIS — E78.5 HYPERLIPIDEMIA, UNSPECIFIED HYPERLIPIDEMIA TYPE: ICD-10-CM

## 2018-06-13 DIAGNOSIS — I10 ESSENTIAL HYPERTENSION: ICD-10-CM

## 2018-06-13 DIAGNOSIS — R07.89 CHEST PAIN, ATYPICAL: ICD-10-CM

## 2018-06-13 PROCEDURE — 3008F BODY MASS INDEX DOCD: CPT | Mod: CPTII,S$GLB,, | Performed by: INTERNAL MEDICINE

## 2018-06-13 PROCEDURE — 99999 PR PBB SHADOW E&M-EST. PATIENT-LVL III: CPT | Mod: PBBFAC,,, | Performed by: INTERNAL MEDICINE

## 2018-06-13 PROCEDURE — 3078F DIAST BP <80 MM HG: CPT | Mod: CPTII,S$GLB,, | Performed by: INTERNAL MEDICINE

## 2018-06-13 PROCEDURE — 99204 OFFICE O/P NEW MOD 45 MIN: CPT | Mod: S$GLB,,, | Performed by: INTERNAL MEDICINE

## 2018-06-13 PROCEDURE — 3075F SYST BP GE 130 - 139MM HG: CPT | Mod: CPTII,S$GLB,, | Performed by: INTERNAL MEDICINE

## 2018-06-13 PROCEDURE — 93000 ELECTROCARDIOGRAM COMPLETE: CPT | Mod: S$GLB,,, | Performed by: INTERNAL MEDICINE

## 2018-06-13 RX ORDER — CARVEDILOL 3.12 MG/1
3.12 TABLET ORAL 2 TIMES DAILY WITH MEALS
Qty: 60 TABLET | Refills: 11 | Status: SHIPPED | OUTPATIENT
Start: 2018-06-13 | End: 2019-07-06 | Stop reason: SDUPTHER

## 2018-06-13 RX ORDER — SODIUM CHLORIDE 9 MG/ML
INJECTION, SOLUTION INTRAVENOUS CONTINUOUS
Status: CANCELLED | OUTPATIENT
Start: 2018-06-13

## 2018-06-13 RX ORDER — ASPIRIN 325 MG
325 TABLET ORAL DAILY
Refills: 0 | COMMUNITY
Start: 2018-06-13 | End: 2018-07-19

## 2018-06-13 NOTE — PROGRESS NOTES
Subjective:    Patient ID:  Ruben Cardenas is a 63 y.o. male who presents for evaluation of Shortness of Breath      HPI     HTN, HLD, tobacco abuse    Referred by NP  This 63 y.o. presents today for complaint of physical exam and refills.  Patient has hypertension hyperlipidemia and hyperglycemia and a murmur.  Taking meds as directed.  He has managed to cut back on the smoking.  He had quit briefly.  He remains physically active.  Doesn't follow any particular diet.    Echo 5/25/18    1 - Moderately depressed left ventricular systolic function (EF 35-40%).  Global hypokinesis.     2 - Concentric remodeling.     3 - Impaired LV relaxation, normal LAP (grade 1 diastolic dysfunction).     4 - Mild aortic stenosis, RUCHI = 1.63 cm2, AVAi = 0.77 cm2/m2, peak velocity = 2.19 m/s, mean gradient = 12 mmHg.     Reports fatigue and MENCHACA for the last year  Gets occasional pain in both shoulders  EKG sinus tachycardia 102 possible old IMI    Review of Systems   Constitution: Negative for decreased appetite.   HENT: Negative for ear discharge.    Eyes: Negative for blurred vision.   Endocrine: Negative for polyphagia.   Skin: Negative for nail changes.   Neurological: Negative for aphonia.   Psychiatric/Behavioral: Negative for hallucinations.        Objective:    Physical Exam   Constitutional: He is oriented to person, place, and time. He appears well-developed and well-nourished.   HENT:   Head: Normocephalic and atraumatic.   Eyes: Conjunctivae are normal. Pupils are equal, round, and reactive to light.   Neck: Normal range of motion. Neck supple.   Cardiovascular: Normal rate, normal heart sounds and intact distal pulses.    Pulmonary/Chest: Effort normal and breath sounds normal.   Abdominal: Soft. Bowel sounds are normal.   Musculoskeletal: Normal range of motion.   Neurological: He is alert and oriented to person, place, and time.   Skin: Skin is warm and dry.         Assessment:       1. Chronic systolic heart failure  6/2018 TTE LVEF 30-35%    2. Essential hypertension    3. Hyperlipidemia, unspecified hyperlipidemia type    4. Chest pain, atypical         Plan:       Add coreg 3.125 bid  R/LHC for new CM - risks/benefits explained and he agrees to proceed

## 2018-06-13 NOTE — LETTER
June 13, 2018      Leroy Dey MD  [address]  [place] [address]           [facility]  [address] [place] [address]  Phone: [phone]          Patient: Ruben Cardenas   MR Number: [id number]   YOB: 1955   Date of Visit: 6/13/2018       Dear Dr. Leroy Dey:    Thank you for referring Ruben Cardenas to me for evaluation. Attached you will find relevant portions of my assessment and plan of care.    If you have questions, please do not hesitate to call me. I look forward to following Ruben Cardenas along with you.    Sincerely,    Kranthi Engel MD    Enclosure  CC:  No Recipients    If you would like to receive this communication electronically, please contact [email or web address] or [phone] to request more information on [email or web address] Link access.    For providers and/or their staff who would like to refer a patient to [facility], please contact us through our one-stop-shop provider referral line, [facility] , at [phone].    If you feel you have received this communication in error or would no longer like to receive these types of communications, please e-mail [email or web address]

## 2018-06-13 NOTE — H&P
[facility]  History & Physical    Subjective:      Chief Complaint/Reason for Admission: /[facility]    Ruben Cardenas is a 63 y.o. male.     HTN, HLD, tobacco abuse    Referred by NP  This 63 y.o. presents today for complaint of physical exam and refills.  Patient has hypertension hyperlipidemia and hyperglycemia and a murmur.  Taking meds as directed.  He has managed to cut back on the smoking.  He had quit briefly.  He remains physically active.  Doesn't follow any particular diet.    Echo 5/25/18    1 - Moderately depressed left ventricular systolic function (EF 35-40%).  Global hypokinesis.     2 - Concentric remodeling.     3 - Impaired LV relaxation, normal LAP (grade 1 diastolic dysfunction).     4 - Mild aortic stenosis, RUCHI = 1.63 cm2, AVAi = 0.77 cm2/m2, peak velocity = 2.19 m/s, mean gradient = 12 mmHg.     Reports fatigue and MENCHACA for the last year  Gets occasional pain in both shoulders  EKG sinus tachycardia 102 possible old IMI        Past Medical History:   Diagnosis Date    Hyperlipidemia     Hypertension      Past Surgical History:   Procedure Laterality Date    APPENDECTOMY       Family History   Problem Relation Age of Onset    Heart disease Mother     Hyperlipidemia Mother     Rashes / Skin problems Mother     COPD Sister         smoker    Alcohol abuse Brother     Hypertension Sister     No Known Problems Sister     No Known Problems Son     No Known Problems Daughter      Social History   Substance Use Topics    Smoking status: Former Smoker     Packs/day: 1.50     Years: 50.00     Types: Cigarettes     Quit date: 2/2/2017    Smokeless tobacco: Current User    Alcohol use 0.0 oz/week         (Not in a hospital admission)  Review of patient's allergies indicates:   Allergen Reactions    Penicillins      Other reaction(s): Hives,itching        Review of Systems   All other systems reviewed and are negative.      Objective:      Vital Signs (Most Recent)  Pulse: 104 (06/13/18  1349)  BP: 132/78 (06/13/18 1349)  SpO2: 98 % (06/13/18 1349)    Vital Signs Range (Last 24H):  [unfilled]    Physical Exam   Constitutional: He is oriented to person, place, and time. He appears well-developed and well-nourished.   HENT:   Head: Normocephalic and atraumatic.   Eyes: EOM are normal. Pupils are equal, round, and reactive to light.   Neck: Normal range of motion. Neck supple.   Cardiovascular: Normal rate and regular rhythm.    Pulmonary/Chest: Effort normal and breath sounds normal.   Abdominal: Soft. Bowel sounds are normal.   Musculoskeletal: Normal range of motion.   Neurological: He is alert and oriented to person, place, and time.   Skin: Skin is warm and dry.       Data Review:    CBC:   Lab Results   Component Value Date    WBC 9.38 04/26/2018    RBC 4.67 04/26/2018    HGB 14.5 04/26/2018    HCT 45.3 04/26/2018     04/26/2018     BMP:   Lab Results   Component Value Date     04/26/2018     04/26/2018    K 4.3 04/26/2018     04/26/2018    CO2 24 04/26/2018    BUN 9 04/26/2018    CREATININE 1.0 04/26/2018    CALCIUM 9.9 04/26/2018      ECG: sinus tachycardia old IMI.     Assessment:      There are no hospital problems to display for this patient.     Newly Dx cardiomyopathy  Mild AS    Plan:      R/C

## 2018-06-19 ENCOUNTER — HOSPITAL ENCOUNTER (OUTPATIENT)
Dept: PREADMISSION TESTING | Facility: HOSPITAL | Age: 63
Discharge: HOME | End: 2018-06-19
Attending: INTERNAL MEDICINE
Payer: MEDICARE

## 2018-06-19 VITALS
HEIGHT: 71 IN | DIASTOLIC BLOOD PRESSURE: 81 MMHG | OXYGEN SATURATION: 94 % | HEART RATE: 85 BPM | SYSTOLIC BLOOD PRESSURE: 136 MMHG | BODY MASS INDEX: 28.42 KG/M2 | WEIGHT: 203 LBS | RESPIRATION RATE: 18 BRPM

## 2018-06-19 DIAGNOSIS — I10 ESSENTIAL HYPERTENSION: ICD-10-CM

## 2018-06-19 DIAGNOSIS — R07.89 CHEST PAIN, ATYPICAL: ICD-10-CM

## 2018-06-19 DIAGNOSIS — I10 HTN (HYPERTENSION): ICD-10-CM

## 2018-06-19 DIAGNOSIS — E78.5 HYPERLIPIDEMIA, UNSPECIFIED HYPERLIPIDEMIA TYPE: ICD-10-CM

## 2018-06-19 DIAGNOSIS — I50.22 CHRONIC SYSTOLIC HEART FAILURE: ICD-10-CM

## 2018-06-19 LAB
ANION GAP SERPL CALC-SCNC: 7 MMOL/L
APTT BLDCRRT: 33.5 SEC
BUN SERPL-MCNC: 9 MG/DL
CALCIUM SERPL-MCNC: 9.6 MG/DL
CHLORIDE SERPL-SCNC: 105 MMOL/L
CO2 SERPL-SCNC: 26 MMOL/L
CREAT SERPL-MCNC: 0.9 MG/DL
ERYTHROCYTE [DISTWIDTH] IN BLOOD BY AUTOMATED COUNT: 15.2 %
EST. GFR  (AFRICAN AMERICAN): >60 ML/MIN/1.73 M^2
EST. GFR  (NON AFRICAN AMERICAN): >60 ML/MIN/1.73 M^2
GLUCOSE SERPL-MCNC: 105 MG/DL
HCT VFR BLD AUTO: 43.5 %
HGB BLD-MCNC: 14.5 G/DL
INR PPP: 1
MCH RBC QN AUTO: 31 PG
MCHC RBC AUTO-ENTMCNC: 33.3 G/DL
MCV RBC AUTO: 93 FL
PLATELET # BLD AUTO: 252 K/UL
PMV BLD AUTO: 9.7 FL
POTASSIUM SERPL-SCNC: 4.5 MMOL/L
PROTHROMBIN TIME: 10 SEC
RBC # BLD AUTO: 4.67 M/UL
SODIUM SERPL-SCNC: 138 MMOL/L
WBC # BLD AUTO: 9.08 K/UL

## 2018-06-19 PROCEDURE — 85610 PROTHROMBIN TIME: CPT

## 2018-06-19 PROCEDURE — 93005 ELECTROCARDIOGRAM TRACING: CPT

## 2018-06-19 PROCEDURE — 85730 THROMBOPLASTIN TIME PARTIAL: CPT

## 2018-06-19 PROCEDURE — 85027 COMPLETE CBC AUTOMATED: CPT

## 2018-06-19 PROCEDURE — 80048 BASIC METABOLIC PNL TOTAL CA: CPT

## 2018-06-19 PROCEDURE — 93010 ELECTROCARDIOGRAM REPORT: CPT | Mod: ,,, | Performed by: INTERNAL MEDICINE

## 2018-06-19 PROCEDURE — 36415 COLL VENOUS BLD VENIPUNCTURE: CPT

## 2018-06-19 NOTE — DISCHARGE INSTRUCTIONS
Your angiogram is scheduled for__6/21/2018_____________    Call [phone] between 2 pm and 5 pm on __6/20/2018__________to find out your arrival time for the day of your procedure.    You will go directly to Same Day Surgery on the 2nd floor of the hospital on the day of your procedure at __________    If you need wheelchair assistance, call [phone] from the lobby using your cell phone.  Or you may use the courtesy phone in the lobby.  The  will direct your call to the Same Day Surgery unit.    IMPORTANT INSTRUCTIONS:  Do not eat or drink anything after midnight.  This includes water and coffee.  It is okay to brush your teeth.  Do not chew gum or have hard candy or mints.    Take your morning medications with small sips of water.        Wash both groins with Hibiclens on the night before your angiogram, and on the morning of your angiogram.  If your doctor has told you that the wrist area will be used for the procedure, wash both wrists.  Be sure to rinse it off.  Do not put Hibiclens directly on your genitals or face.     You may wear deodorant, but do not wear body lotion, powder or cologne       Do not wear jewelry.     You do not need money or valuables.  You may bring your cell phone.     If you are going home the same day, you must arrange for someone to drive you home.     Wear comfortable, loose fitting clothes.     Call your doctor if you have sickness or fever before your angiogram.     Our number in [facility] is [phone] if you need to contact us.

## 2018-06-21 ENCOUNTER — SURGERY (OUTPATIENT)
Age: 63
End: 2018-06-21

## 2018-06-21 ENCOUNTER — HOSPITAL ENCOUNTER (OUTPATIENT)
Facility: HOSPITAL | Age: 63
Discharge: HOME | End: 2018-06-22
Attending: INTERNAL MEDICINE | Admitting: INTERNAL MEDICINE
Payer: MEDICARE

## 2018-06-21 DIAGNOSIS — I10 ESSENTIAL HYPERTENSION: ICD-10-CM

## 2018-06-21 DIAGNOSIS — Z98.62 STATUS POST ANGIOPLASTY: ICD-10-CM

## 2018-06-21 DIAGNOSIS — I10 HTN (HYPERTENSION): ICD-10-CM

## 2018-06-21 DIAGNOSIS — E78.5 HYPERLIPIDEMIA, UNSPECIFIED HYPERLIPIDEMIA TYPE: ICD-10-CM

## 2018-06-21 DIAGNOSIS — R07.89 CHEST PAIN, ATYPICAL: ICD-10-CM

## 2018-06-21 DIAGNOSIS — I50.22 CHRONIC SYSTOLIC HEART FAILURE: ICD-10-CM

## 2018-06-21 LAB — CORONARY STENOSIS: ABNORMAL

## 2018-06-21 PROCEDURE — 93458 L HRT ARTERY/VENTRICLE ANGIO: CPT | Mod: 26,,, | Performed by: INTERNAL MEDICINE

## 2018-06-21 PROCEDURE — C1887 CATHETER, GUIDING: HCPCS

## 2018-06-21 PROCEDURE — 93458 L HRT ARTERY/VENTRICLE ANGIO: CPT | Mod: 59

## 2018-06-21 PROCEDURE — 92928 PRQ TCAT PLMT NTRAC ST 1 LES: CPT | Mod: LD,,, | Performed by: INTERNAL MEDICINE

## 2018-06-21 PROCEDURE — 25000003 PHARM REV CODE 250: Performed by: INTERNAL MEDICINE

## 2018-06-21 PROCEDURE — 93571 IV DOP VEL&/PRESS C FLO 1ST: CPT

## 2018-06-21 PROCEDURE — 93571 IV DOP VEL&/PRESS C FLO 1ST: CPT | Mod: 26,LD,, | Performed by: INTERNAL MEDICINE

## 2018-06-21 PROCEDURE — 25500020 PHARM REV CODE 255

## 2018-06-21 PROCEDURE — 63600175 PHARM REV CODE 636 W HCPCS

## 2018-06-21 PROCEDURE — 99152 MOD SED SAME PHYS/QHP 5/>YRS: CPT

## 2018-06-21 PROCEDURE — 99152 MOD SED SAME PHYS/QHP 5/>YRS: CPT | Mod: ,,, | Performed by: INTERNAL MEDICINE

## 2018-06-21 PROCEDURE — 25000003 PHARM REV CODE 250

## 2018-06-21 PROCEDURE — 93005 ELECTROCARDIOGRAM TRACING: CPT | Mod: 59

## 2018-06-21 PROCEDURE — 27000014 CATH LAB PROCEDURE

## 2018-06-21 PROCEDURE — 93010 ELECTROCARDIOGRAM REPORT: CPT | Mod: ,,, | Performed by: INTERNAL MEDICINE

## 2018-06-21 RX ORDER — MORPHINE SULFATE 4 MG/ML
3 INJECTION, SOLUTION INTRAMUSCULAR; INTRAVENOUS
Status: DISCONTINUED | OUTPATIENT
Start: 2018-06-21 | End: 2018-06-22 | Stop reason: HOSPADM

## 2018-06-21 RX ORDER — SIMVASTATIN 10 MG/1
20 TABLET, FILM COATED ORAL NIGHTLY
Status: DISCONTINUED | OUTPATIENT
Start: 2018-06-21 | End: 2018-06-22 | Stop reason: HOSPADM

## 2018-06-21 RX ORDER — MORPHINE SULFATE 10 MG/ML
3 INJECTION INTRAMUSCULAR; INTRAVENOUS; SUBCUTANEOUS
Status: DISCONTINUED | OUTPATIENT
Start: 2018-06-21 | End: 2018-06-21

## 2018-06-21 RX ORDER — LOSARTAN POTASSIUM 25 MG/1
100 TABLET ORAL DAILY
Status: DISCONTINUED | OUTPATIENT
Start: 2018-06-21 | End: 2018-06-22 | Stop reason: HOSPADM

## 2018-06-21 RX ORDER — SODIUM CHLORIDE 9 MG/ML
INJECTION, SOLUTION INTRAVENOUS CONTINUOUS
Status: DISCONTINUED | OUTPATIENT
Start: 2018-06-21 | End: 2018-06-22

## 2018-06-21 RX ORDER — CLOPIDOGREL BISULFATE 75 MG/1
75 TABLET ORAL DAILY
Status: DISCONTINUED | OUTPATIENT
Start: 2018-06-21 | End: 2018-06-22 | Stop reason: HOSPADM

## 2018-06-21 RX ORDER — HYDROCODONE BITARTRATE AND ACETAMINOPHEN 5; 325 MG/1; MG/1
1 TABLET ORAL EVERY 4 HOURS PRN
Status: DISCONTINUED | OUTPATIENT
Start: 2018-06-21 | End: 2018-06-22 | Stop reason: HOSPADM

## 2018-06-21 RX ORDER — ASPIRIN 325 MG
325 TABLET ORAL DAILY
Status: DISCONTINUED | OUTPATIENT
Start: 2018-06-21 | End: 2018-06-22 | Stop reason: HOSPADM

## 2018-06-21 RX ORDER — ONDANSETRON 2 MG/ML
4 INJECTION INTRAMUSCULAR; INTRAVENOUS EVERY 12 HOURS PRN
Status: DISCONTINUED | OUTPATIENT
Start: 2018-06-21 | End: 2018-06-22 | Stop reason: HOSPADM

## 2018-06-21 RX ORDER — CARVEDILOL 3.12 MG/1
3.12 TABLET ORAL 2 TIMES DAILY WITH MEALS
Status: DISCONTINUED | OUTPATIENT
Start: 2018-06-21 | End: 2018-06-22 | Stop reason: HOSPADM

## 2018-06-21 RX ADMIN — CLOPIDOGREL BISULFATE 75 MG: 75 TABLET ORAL at 01:06

## 2018-06-21 RX ADMIN — SODIUM CHLORIDE: 9 INJECTION, SOLUTION INTRAVENOUS at 06:06

## 2018-06-21 RX ADMIN — SIMVASTATIN 20 MG: 20 TABLET, FILM COATED ORAL at 09:06

## 2018-06-21 NOTE — PROCEDURES
"Ruben Cardenas is a 63 y.o. male patient.    Temp: 98.1 °F (36.7 °C) (06/21/18 0708)  Pulse: 84 (06/21/18 0708)  Resp: 18 (06/21/18 0708)  BP: 94/64 (06/21/18 0708)  SpO2: (!) 94 % (06/21/18 0708)  Weight: 92 kg (202 lb 13.2 oz) (06/21/18 0633)  Height: 5' 11" (180.3 cm) (06/21/18 0633)       Procedures     [facility]   Dr Engel  Pre-op Dx cardiomyopathy  Post-op Dx same  Specimen none  EBL < 50 cc    6/21/18 LHC - LAD mid 60%, D1 small 90% proximal, Cx mild diffuse disease, % proximal - distal vessel fills with some left to right collaterals. Attempted RHC - right % before IVC    Will review with Dr Ly for iFR of LAD    Kranthi Engel  6/21/2018  "

## 2018-06-21 NOTE — H&P
[facility]  History & Physical    Subjective:      Chief Complaint/Reason for Admission: /[facility]    Ruben Cardenas is a 63 y.o. male.     HTN, HLD, tobacco abuse    Referred by NP  This 63 y.o. presents today for complaint of physical exam and refills.  Patient has hypertension hyperlipidemia and hyperglycemia and a murmur.  Taking meds as directed.  He has managed to cut back on the smoking.  He had quit briefly.  He remains physically active.  Doesn't follow any particular diet.    Echo 5/25/18    1 - Moderately depressed left ventricular systolic function (EF 35-40%).  Global hypokinesis.     2 - Concentric remodeling.     3 - Impaired LV relaxation, normal LAP (grade 1 diastolic dysfunction).     4 - Mild aortic stenosis, RUCHI = 1.63 cm2, AVAi = 0.77 cm2/m2, peak velocity = 2.19 m/s, mean gradient = 12 mmHg.     Reports fatigue and MENCHACA for the last year  Gets occasional pain in both shoulders  EKG sinus tachycardia 102 possible old IMI        Past Medical History:   Diagnosis Date    Hyperlipidemia     Hypertension      Past Surgical History:   Procedure Laterality Date    APPENDECTOMY       Family History   Problem Relation Age of Onset    Heart disease Mother     Hyperlipidemia Mother     Rashes / Skin problems Mother     COPD Sister         smoker    Alcohol abuse Brother     Hypertension Sister     No Known Problems Sister     No Known Problems Son     No Known Problems Daughter      Social History   Substance Use Topics    Smoking status: Former Smoker     Packs/day: 1.00     Years: 50.00     Types: Cigarettes     Quit date: 2/2/2017    Smokeless tobacco: Current User    Alcohol use 8.4 oz/week     14 Cans of beer per week       PTA Medications   Medication Sig    aspirin 325 MG tablet Take 1 tablet (325 mg total) by mouth once daily.    carvedilol (COREG) 3.125 MG tablet Take 1 tablet (3.125 mg total) by mouth 2 (two) times daily with meals.    losartan (COZAAR) 100 MG tablet Take  1 tablet (100 mg total) by mouth once daily.    simvastatin (ZOCOR) 20 MG tablet Take 1 tablet (20 mg total) by mouth every evening.     Review of patient's allergies indicates:   Allergen Reactions    Penicillins      Other reaction(s): Hives,itching        Review of Systems   All other systems reviewed and are negative.      Objective:      Vital Signs (Most Recent)  Temp: 98.1 °F (36.7 °C) (06/21/18 0708)  Pulse: 84 (06/21/18 0708)  Resp: 18 (06/21/18 0708)  BP: 94/64 (06/21/18 0708)  SpO2: (!) 94 % (06/21/18 0708)    Vital Signs Range (Last 24H):  [unfilled]    Physical Exam   Constitutional: He is oriented to person, place, and time. He appears well-developed and well-nourished.   HENT:   Head: Normocephalic and atraumatic.   Eyes: EOM are normal. Pupils are equal, round, and reactive to light.   Neck: Normal range of motion. Neck supple.   Cardiovascular: Normal rate and regular rhythm.    Pulmonary/Chest: Effort normal and breath sounds normal.   Abdominal: Soft. Bowel sounds are normal.   Musculoskeletal: Normal range of motion.   Neurological: He is alert and oriented to person, place, and time.   Skin: Skin is warm and dry.       Data Review:    CBC:   Lab Results   Component Value Date    WBC 9.08 06/19/2018    RBC 4.67 06/19/2018    HGB 14.5 06/19/2018    HCT 43.5 06/19/2018     06/19/2018     BMP:   Lab Results   Component Value Date     06/19/2018     06/19/2018    K 4.5 06/19/2018     06/19/2018    CO2 26 06/19/2018    BUN 9 06/19/2018    CREATININE 0.9 06/19/2018    CALCIUM 9.6 06/19/2018      ECG: sinus tachycardia old IMI.     Assessment:      Active Hospital Problems    Diagnosis  POA    Chronic systolic heart failure 6/2018 TTE LVEF 30-35% [I50.22]  Yes      Resolved Hospital Problems    Diagnosis Date Resolved POA   No resolved problems to display.      Newly Dx cardiomyopathy  Mild AS    Plan:      R/LHC

## 2018-06-21 NOTE — NURSING
Checked with Dr. Engel if it is ok to give the patient Clopidogrel  because he received Brilinta 180mg this morning. He said it is ok to give the patient clopidogrel.

## 2018-06-21 NOTE — BRIEF OP NOTE
[facility] [place]rief [place]ative Note    SUMMARY     Surgery Date: 6/21/2018     Surgeon(s) and Role:     * Kranthi Engel MD - Primary    Assisting Surgeon: None    Pre-op Diagnosis:  Chronic systolic heart failure [I50.22]    Post-op Diagnosis:  Post-Op Diagnosis Codes:     * Chronic systolic heart failure [I50.22], CAD    Procedure(s) (LRB):  Heart Cath-Bilateral (N/A)    Anesthesia: RN IV Sedation    Description of Procedure: IFR guided PCI of the LAD    Description of the findings of the procedure: Proximal 70-80% lesion which was eccentric and hazy.  IFR was equal to 0.86.  Then placed a 3 x 22 mm resolute drug-eluting stent.  We post dilated the proximal portion with a 3.5 x 12 mm noncompliant balloon.  Good result was achieved with NOMI-3 flow through the vessel.  Please see Dr. Engel is no for full details of diagnostic portion of the catheterization.    Recommendation:  -Dual antiplatelet therapy minimal one year  -Continue medicines for secondary prevention  -Observe overnight for any complications, likely DC in a.m.    Estimated Blood Loss: 50 cc        Specimens:   Specimen (12h ago through future)    None

## 2018-06-21 NOTE — PROCEDURES
"Ruben Cardenas is a 63 y.o. male patient.    Temp: 98.1 °F (36.7 °C) (06/21/18 0708)  Pulse: 84 (06/21/18 0708)  Resp: 18 (06/21/18 0708)  BP: 94/64 (06/21/18 0708)  SpO2: (!) 94 % (06/21/18 0708)  Weight: 92 kg (202 lb 13.2 oz) (06/21/18 0633)  Height: 5' 11" (180.3 cm) (06/21/18 0633)       Procedures     Pre-sedation Assessment:    1. ASA Score: ASA 2 - Patient with mild systemic disease with no functional limitations  2. Mallampati Class: II (hard and soft palate, upper portion of tonsils anduvula visible)  3. Patient or family history of any reaction to anesthesia or sedation: No  4. Plan for Sedation: Moderate  5. H&P within 30 days of the procedure and updated within 24 hrs of admission or registration: Yes    Kranthi Engel  6/21/2018  "

## 2018-06-22 VITALS
SYSTOLIC BLOOD PRESSURE: 153 MMHG | OXYGEN SATURATION: 93 % | RESPIRATION RATE: 16 BRPM | DIASTOLIC BLOOD PRESSURE: 79 MMHG | TEMPERATURE: 98 F | HEIGHT: 71 IN | BODY MASS INDEX: 28.39 KG/M2 | HEART RATE: 87 BPM | WEIGHT: 202.81 LBS

## 2018-06-22 PROBLEM — I25.119 CORONARY ARTERY DISEASE INVOLVING NATIVE CORONARY ARTERY WITH ANGINA PECTORIS: Status: ACTIVE | Noted: 2018-06-22

## 2018-06-22 LAB
ANION GAP SERPL CALC-SCNC: 10 MMOL/L
BASOPHILS # BLD AUTO: 0.03 K/UL
BASOPHILS NFR BLD: 0.3 %
BUN SERPL-MCNC: 10 MG/DL
CALCIUM SERPL-MCNC: 9.7 MG/DL
CHLORIDE SERPL-SCNC: 103 MMOL/L
CO2 SERPL-SCNC: 25 MMOL/L
CREAT SERPL-MCNC: 0.9 MG/DL
DIFFERENTIAL METHOD: ABNORMAL
EOSINOPHIL # BLD AUTO: 0.2 K/UL
EOSINOPHIL NFR BLD: 2 %
ERYTHROCYTE [DISTWIDTH] IN BLOOD BY AUTOMATED COUNT: 15 %
EST. GFR  (AFRICAN AMERICAN): >60 ML/MIN/1.73 M^2
EST. GFR  (NON AFRICAN AMERICAN): >60 ML/MIN/1.73 M^2
GLUCOSE SERPL-MCNC: 107 MG/DL
HCT VFR BLD AUTO: 42.6 %
HGB BLD-MCNC: 14.6 G/DL
LYMPHOCYTES # BLD AUTO: 2.4 K/UL
LYMPHOCYTES NFR BLD: 26 %
MCH RBC QN AUTO: 31.5 PG
MCHC RBC AUTO-ENTMCNC: 34.3 G/DL
MCV RBC AUTO: 92 FL
MONOCYTES # BLD AUTO: 0.8 K/UL
MONOCYTES NFR BLD: 9.1 %
NEUTROPHILS # BLD AUTO: 5.7 K/UL
NEUTROPHILS NFR BLD: 62.2 %
PLATELET # BLD AUTO: 264 K/UL
PMV BLD AUTO: 10 FL
POTASSIUM SERPL-SCNC: 3.9 MMOL/L
RBC # BLD AUTO: 4.64 M/UL
SODIUM SERPL-SCNC: 138 MMOL/L
WBC # BLD AUTO: 9.2 K/UL

## 2018-06-22 PROCEDURE — 36415 COLL VENOUS BLD VENIPUNCTURE: CPT

## 2018-06-22 PROCEDURE — 25000003 PHARM REV CODE 250: Performed by: INTERNAL MEDICINE

## 2018-06-22 PROCEDURE — 80048 BASIC METABOLIC PNL TOTAL CA: CPT

## 2018-06-22 PROCEDURE — 85025 COMPLETE CBC W/AUTO DIFF WBC: CPT

## 2018-06-22 RX ORDER — CLOPIDOGREL BISULFATE 75 MG/1
75 TABLET ORAL DAILY
Qty: 90 TABLET | Refills: 3 | Status: SHIPPED | OUTPATIENT
Start: 2018-06-22 | End: 2019-04-08 | Stop reason: SDUPTHER

## 2018-06-22 RX ADMIN — LOSARTAN POTASSIUM 100 MG: 25 TABLET, FILM COATED ORAL at 08:06

## 2018-06-22 RX ADMIN — ASPIRIN 325 MG ORAL TABLET 325 MG: 325 PILL ORAL at 08:06

## 2018-06-22 RX ADMIN — CLOPIDOGREL BISULFATE 75 MG: 75 TABLET ORAL at 08:06

## 2018-06-22 RX ADMIN — CARVEDILOL 3.12 MG: 3.12 TABLET, FILM COATED ORAL at 08:06

## 2018-06-22 NOTE — PROGRESS NOTES
WRITTEN HEALTHCARE DISCHARGE INFORMATION      Things that YOU are responsible for to Manage Your Care At Home:  1. Getting your prescriptions filled.  2. Taking you medications as directed. DO NOT MISS ANY DOSES!  3. Going to your follow-up doctor appointments. This is important because it allows the doctor to monitor your progress and to determine if any changes need to be made to your treatment plan.     If you are unable to make your follow up appointments, please call the number listed and reschedule this appointment.      After discharge, if you need assistance, you can call [facility] On Call Nurse Care Line for 24/7 assistance at [phone]     If you are experience any signs or symptoms, Call your doctor or Call 911 and come to your nearest Emergency Room.     Thank you for choosing [facility] and allowing us to care for you.        You should receive a call from [facility] Discharge Department within 48-72 hours to help manage your care after discharge. Please try to make sure that you answer your phone for this important phone call.     Follow-up Information     Kranthi Engel MD On 7/5/2018.    Specialty:  Cardiology  Why:  Appointment scheduled for Thursday July 5th at 11:30am  Contact information:  [address]  [place] [address]  [phone]             Charles Hwang MD On 7/19/2018.    Specialty:  Internal Medicine  Why:  Appointment scheduled for Thursday July 19th at 9:20am  Contact information:  [address] [facility]  [place] [address]  [phone]

## 2018-06-22 NOTE — DISCHARGE SUMMARY
[facility] [place]      Patient Name: Ruben Cardenas  MRN: [id number]  Admission Date: 6/21/2018  Hospital Length of Stay: 0 days  Discharge Date and Time:  06/22/2018 8:17 AM  Attending Physician: Kranthi Engel MD    Discharging Provider: Kranthi Engel MD  Primary Care Physician: Charles Hwang MD    HPI:   HTN, HLD, tobacco abuse     Referred by NP  This 63 y.o. presents today for complaint of physical exam and refills.  Patient has hypertension hyperlipidemia and hyperglycemia and a murmur.  Taking meds as directed.  He has managed to cut back on the smoking.  He had quit briefly.  He remains physically active.  Doesn't follow any particular diet.     Echo 5/25/18    1 - Moderately depressed left ventricular systolic function (EF 35-40%).  Global hypokinesis.     2 - Concentric remodeling.     3 - Impaired LV relaxation, normal LAP (grade 1 diastolic dysfunction).     4 - Mild aortic stenosis, RUCHI = 1.63 cm2, AVAi = 0.77 cm2/m2, peak velocity = 2.19 m/s, mean gradient = 12 mmHg.      Reports fatigue and MENCHACA for the last year  Gets occasional pain in both shoulders  EKG sinus tachycardia 102 possible old IMI       Procedure(s) (LRB):  Heart Cath-Bilateral (N/A)     Indwelling Lines/Drains at time of discharge:  Lines/Drains/Airways          No matching active lines, drains, or airways          Hospital Course:  6/21/18 LHC - LAD mid 60%, D1 small 90% proximal, Cx mild diffuse disease, % proximal - distal vessel fills with some left to right collaterals. Attempted RHC - right % before IVC    Description of Procedure: IFR guided PCI of the LAD     Description of the findings of the procedure: Proximal 70-80% lesion which was eccentric and hazy.  IFR was equal to 0.86.  Then placed a 3 x 22 mm resolute drug-eluting stent.  We post dilated the proximal portion with a 3.5 x 12 mm noncompliant balloon.  Good result was achieved with NOMI-3 flow through the  vessel.  Please see Dr. Engel is no for full details of diagnostic portion of the catheterization.     Recommendation:  -Dual antiplatelet therapy minimal one year  -Continue medicines for secondary prevention  -Observe overnight for any complications, likely DC in a.m.    Did well after PCI    Consults:     Significant Diagnostic Studies: Angiography:     Pending Diagnostic Studies:     Procedure Component Value Units Date/Time    EKG 12-LEAD on arrival to floor [[id number]]     Order Status:  Sent Lab Status:  No result     EKG 12-lead [[id number]]     Order Status:  Sent Lab Status:  No result           Final Active Diagnoses:    Diagnosis Date Noted POA    PRINCIPAL PROBLEM:  Chronic systolic heart failure 6/2018 TTE LVEF 30-35% [I50.22] 06/04/2018 Yes    Coronary artery disease involving native coronary artery with angina pectoris [I25.119] 06/22/2018 Yes    Smoker [F17.200] 02/07/2017 Yes    Hyperlipidemia [E78.5] 09/19/2012 Yes    HTN (hypertension) [I10] 08/06/2012 Yes      Problems Resolved During this Admission:    Diagnosis Date Noted Date Resolved POA     * Chronic systolic heart failure 6/2018 TTE LVEF 30-35%             Coronary artery disease involving native coronary artery with angina pectoris    6/21/18 LHC - LAD mid 60%, D1 small 90% proximal, Cx mild diffuse disease, % proximal - distal vessel fills with some left to right collaterals. Attempted RHC - right % before IVC    Description of Procedure: IFR guided PCI of the LAD     Description of the findings of the procedure: Proximal 70-80% lesion which was eccentric and hazy.  IFR was equal to 0.86.  Then placed a 3 x 22 mm resolute drug-eluting stent.  We post dilated the proximal portion with a 3.5 x 12 mm noncompliant balloon.  Good result was achieved with NOMI-3 flow through the vessel.  Please see Dr. Engel is no for full details of diagnostic portion of the catheterization.     Recommendation:  -Dual antiplatelet therapy  minimal one year  -Continue medicines for secondary prevention  -Observe overnight for any complications, likely DC in a.m.        Smoker    counseled        Hyperlipidemia             HTN (hypertension)                 Discharged Condition: good    Disposition: Home or Self Care    Follow Up: Dr Engel 1 week    Cardiac diet  No heavy lifting 4 days    Patient Instructions:   No discharge procedures on file.  Medications:  Reconciled Home Medications:      Medication List      START taking these medications    clopidogrel 75 mg tablet  Commonly known as:  PLAVIX  Take 1 tablet (75 mg total) by mouth once daily.        CONTINUE taking these medications    aspirin 325 MG tablet  Take 1 tablet (325 mg total) by mouth once daily.     carvedilol 3.125 MG tablet  Commonly known as:  COREG  Take 1 tablet (3.125 mg total) by mouth 2 (two) times daily with meals.     losartan 100 MG tablet  Commonly known as:  COZAAR  Take 1 tablet (100 mg total) by mouth once daily.     simvastatin 20 MG tablet  Commonly known as:  ZOCOR  Take 1 tablet (20 mg total) by mouth every evening.            Time spent on the discharge of patient: 30 minutes    Kranthi Engel MD  Cardiology  [facility]

## 2018-06-22 NOTE — PLAN OF CARE
"   06/22/18 1038   Final Note   Assessment Type Final Discharge Note   Discharge Disposition Home   Hospital Follow Up  Appt(s) scheduled? Yes   Discharge plans and expectations educations in teach back method with documentation complete? Yes   Right Care Referral Info   Post Acute Recommendation No Care   EDUCATION:  provided with educational information on angioplasty/stent placement .  Information reviewed and placed in :My Healthcare Packet" to be brought home for pt to use as resource after discharge.  Information included:  signs and symptoms to look for and call the doctor if experiencing, and symptoms that may indicate a medical emergency: CALL 911.      All questions answered.  Teach back method used.    Patient stated, " call doctor if chest pain returns and he will take medications as per doctor and go to follow up appointments."      pts nurse Rebecca notified that all CM needs have been addressed and that she may proceed with nursing d/c instructions and teaching to complete d/c process.  "

## 2018-06-22 NOTE — HOSPITAL COURSE
6/21/18 LHC - LAD mid 60%, D1 small 90% proximal, Cx mild diffuse disease, % proximal - distal vessel fills with some left to right collaterals. Attempted RHC - right % before IVC    Description of Procedure: IFR guided PCI of the LAD     Description of the findings of the procedure: Proximal 70-80% lesion which was eccentric and hazy.  IFR was equal to 0.86.  Then placed a 3 x 22 mm resolute drug-eluting stent.  We post dilated the proximal portion with a 3.5 x 12 mm noncompliant balloon.  Good result was achieved with NOMI-3 flow through the vessel.  Please see Dr. Engel is no for full details of diagnostic portion of the catheterization.     Recommendation:  -Dual antiplatelet therapy minimal one year  -Continue medicines for secondary prevention  -Observe overnight for any complications, likely DC in a.m.    Did well after PCI

## 2018-06-22 NOTE — PLAN OF CARE
06/22/18 0830   Discharge Assessment   Assessment Type Discharge Planning Assessment  (pt discharged prior to compeletion)

## 2018-06-22 NOTE — HPI
HTN, HLD, tobacco abuse     Referred by NP  This 63 y.o. presents today for complaint of physical exam and refills.  Patient has hypertension hyperlipidemia and hyperglycemia and a murmur.  Taking meds as directed.  He has managed to cut back on the smoking.  He had quit briefly.  He remains physically active.  Doesn't follow any particular diet.     Echo 5/25/18    1 - Moderately depressed left ventricular systolic function (EF 35-40%).  Global hypokinesis.     2 - Concentric remodeling.     3 - Impaired LV relaxation, normal LAP (grade 1 diastolic dysfunction).     4 - Mild aortic stenosis, RUCHI = 1.63 cm2, AVAi = 0.77 cm2/m2, peak velocity = 2.19 m/s, mean gradient = 12 mmHg.      Reports fatigue and MENCHACA for the last year  Gets occasional pain in both shoulders  EKG sinus tachycardia 102 possible old IMI

## 2018-06-22 NOTE — ASSESSMENT & PLAN NOTE
6/21/18 LHC - LAD mid 60%, D1 small 90% proximal, Cx mild diffuse disease, % proximal - distal vessel fills with some left to right collaterals. Attempted RHC - right % before IVC    Description of Procedure: IFR guided PCI of the LAD     Description of the findings of the procedure: Proximal 70-80% lesion which was eccentric and hazy.  IFR was equal to 0.86.  Then placed a 3 x 22 mm resolute drug-eluting stent.  We post dilated the proximal portion with a 3.5 x 12 mm noncompliant balloon.  Good result was achieved with NOMI-3 flow through the vessel.  Please see Dr. Engel is no for full details of diagnostic portion of the catheterization.     Recommendation:  -Dual antiplatelet therapy minimal one year  -Continue medicines for secondary prevention  -Observe overnight for any complications, likely DC in a.m.

## 2018-06-22 NOTE — NURSING
Discharge instructions given to patient and wife, both verbalized understanding. Patient left with transport by wheelchair to the family vehicle. No distress noted.

## 2018-06-26 DIAGNOSIS — Z12.5 SCREENING FOR PROSTATE CANCER: ICD-10-CM

## 2018-06-26 DIAGNOSIS — I10 ESSENTIAL HYPERTENSION: ICD-10-CM

## 2018-06-26 DIAGNOSIS — R73.09 ABNORMAL GLUCOSE: ICD-10-CM

## 2018-06-26 DIAGNOSIS — E78.5 HYPERLIPIDEMIA, UNSPECIFIED HYPERLIPIDEMIA TYPE: Primary | ICD-10-CM

## 2018-06-26 LAB
CORONARY STENOSIS: ABNORMAL
CORONARY STENT: YES

## 2018-06-26 RX ORDER — LOSARTAN POTASSIUM 100 MG/1
TABLET ORAL
Qty: 90 TABLET | Refills: 0 | Status: SHIPPED | OUTPATIENT
Start: 2018-06-26 | End: 2020-01-10 | Stop reason: SDUPTHER

## 2018-07-05 ENCOUNTER — OFFICE VISIT (OUTPATIENT)
Dept: CARDIOLOGY | Facility: CLINIC | Age: 63
End: 2018-07-05
Payer: MEDICARE

## 2018-07-05 VITALS
DIASTOLIC BLOOD PRESSURE: 50 MMHG | BODY MASS INDEX: 28.28 KG/M2 | WEIGHT: 202 LBS | SYSTOLIC BLOOD PRESSURE: 87 MMHG | HEIGHT: 71 IN | OXYGEN SATURATION: 97 % | HEART RATE: 76 BPM

## 2018-07-05 DIAGNOSIS — I10 ESSENTIAL HYPERTENSION: ICD-10-CM

## 2018-07-05 DIAGNOSIS — E78.5 HYPERLIPIDEMIA, UNSPECIFIED HYPERLIPIDEMIA TYPE: ICD-10-CM

## 2018-07-05 DIAGNOSIS — I50.22 CHRONIC SYSTOLIC HEART FAILURE: Primary | ICD-10-CM

## 2018-07-05 DIAGNOSIS — I25.119 CORONARY ARTERY DISEASE INVOLVING NATIVE CORONARY ARTERY OF NATIVE HEART WITH ANGINA PECTORIS: ICD-10-CM

## 2018-07-05 DIAGNOSIS — R07.89 CHEST PAIN, ATYPICAL: ICD-10-CM

## 2018-07-05 DIAGNOSIS — I35.0 NONRHEUMATIC AORTIC VALVE STENOSIS: ICD-10-CM

## 2018-07-05 PROCEDURE — 99214 OFFICE O/P EST MOD 30 MIN: CPT | Mod: S$GLB,,, | Performed by: INTERNAL MEDICINE

## 2018-07-05 PROCEDURE — 3078F DIAST BP <80 MM HG: CPT | Mod: CPTII,S$GLB,, | Performed by: INTERNAL MEDICINE

## 2018-07-05 PROCEDURE — 3074F SYST BP LT 130 MM HG: CPT | Mod: CPTII,S$GLB,, | Performed by: INTERNAL MEDICINE

## 2018-07-05 PROCEDURE — 99999 PR PBB SHADOW E&M-EST. PATIENT-LVL III: CPT | Mod: PBBFAC,,, | Performed by: INTERNAL MEDICINE

## 2018-07-05 PROCEDURE — 3008F BODY MASS INDEX DOCD: CPT | Mod: CPTII,S$GLB,, | Performed by: INTERNAL MEDICINE

## 2018-07-05 NOTE — PROGRESS NOTES
Subjective:    Patient ID:  Ruben Cardenas is a 63 y.o. male who presents for follow-up of Post-op Evaluation      HPI     CAD - JOSÉ to LAD 6/21/18 - %, Ischemic CM - EF 35-40%, HTN, HLD, tobacco abuse     Referred by NP  This 63 y.o. presents today for complaint of physical exam and refills.  Patient has hypertension hyperlipidemia and hyperglycemia and a murmur.  Taking meds as directed.  He has managed to cut back on the smoking.  He had quit briefly.  He remains physically active.  Doesn't follow any particular diet.     Echo 5/25/18    1 - Moderately depressed left ventricular systolic function (EF 35-40%).  Global hypokinesis.     2 - Concentric remodeling.     3 - Impaired LV relaxation, normal LAP (grade 1 diastolic dysfunction).     4 - Mild aortic stenosis, RUCHI = 1.63 cm2, AVAi = 0.77 cm2/m2, peak velocity = 2.19 m/s, mean gradient = 12 mmHg.     6/21/18 LHC - LAD mid 60%, D1 small 90% proximal, Cx mild diffuse disease, % proximal - distal vessel fills with some left to right collaterals. Attempted RHC - right % before IVC     Description of Procedure: IFR guided PCI of the LAD     Description of the findings of the procedure: Proximal 70-80% lesion which was eccentric and hazy.  IFR was equal to 0.86.  Then placed a 3 x 22 mm resolute drug-eluting stent.  We post dilated the proximal portion with a 3.5 x 12 mm noncompliant balloon.  Good result was achieved with NOMI-3 flow through the vessel.  Please see Dr. Engel is no for full details of diagnostic portion of the catheterization.    6/13/18 Reports fatigue and MENCHACA for the last year  Gets occasional pain in both shoulders  EKG sinus tachycardia 102 possible old IMI     Denies CP, SOB, or dizziness since PCI         Review of Systems   Constitution: Negative for decreased appetite.   HENT: Negative for ear discharge.    Eyes: Negative for blurred vision.   Endocrine: Negative for polyphagia.   Skin: Negative for nail changes.    Neurological: Negative for aphonia.   Psychiatric/Behavioral: Negative for hallucinations.        Objective:    Physical Exam   Constitutional: He is oriented to person, place, and time. He appears well-developed and well-nourished.   HENT:   Head: Normocephalic and atraumatic.   Eyes: Conjunctivae are normal. Pupils are equal, round, and reactive to light.   Neck: Normal range of motion. Neck supple.   Cardiovascular: Normal rate, normal heart sounds and intact distal pulses.    Pulmonary/Chest: Effort normal and breath sounds normal.   Abdominal: Soft. Bowel sounds are normal.   Musculoskeletal: Normal range of motion.   Neurological: He is alert and oriented to person, place, and time.   Skin: Skin is warm and dry.         Assessment:       1. Chronic systolic heart failure 6/2018 TTE LVEF 30-35%    2. Nonrheumatic aortic valve stenosis; mild stenosis on TTE 6/2018    3. Coronary artery disease involving native coronary artery of native heart with angina pectoris    4. Essential hypertension    5. Hyperlipidemia, unspecified hyperlipidemia type    6. Chest pain, atypical         Plan:       Decrease ASA 81 qd  OV 3 months with repeat echo

## 2018-07-18 NOTE — PROGRESS NOTES
This note was created by combination of typed  and Dragon dictation.  Transcription errors may be present.  If there are any questions, please contact me.    Assessment & Plan:   Coronary artery disease involving native coronary artery of native heart with angina pectoris  -status post left heart catheterization with stent placement proximal LAD June 2018.  On dual antiplatelet therapy.  Needs to stay on this until June 2019 and this was emphasized with him.  On statin, last lipid profile with LDL less than 100.  On beta-blocker.  CHF.  Ischemic?  Plan is to repeat echo in 3 months after revascularization of the LAD.  Has occluded RCA.  -     aspirin (ECOTRIN) 81 MG EC tablet; Take 1 tablet (81 mg total) by mouth once daily.; Refill: 0    AAA screening-AAA ultrasound ordered, contact information for Radiology Department given to patient.    Need for vaccination for Strep pneumoniae-Prevnar today.  -     Pneumococcal Conjugate Vaccine (13 Valent) (IM)    He stop smoking back in June.  Congratulated him on his success thus far.    Medications Discontinued During This Encounter   Medication Reason    losartan (COZAAR) 100 MG tablet Therapy completed    aspirin 325 MG tablet      Modified Medications    No medications on file     New Prescriptions    ASPIRIN (ECOTRIN) 81 MG EC TABLET    Take 1 tablet (81 mg total) by mouth once daily.       Follow Up: No Follow-up on file.  Follow-up 6 months.  In the interim follow-up with Cardiology.        Subjective:   No chief complaint on file.      SERGE Miranda is a 63 y.o. male, last appointment with this clinic was 4/26/2018.    CHF on TTE done 5/2018 for murmur. 5/25/2018 TTE LVEF 35-40%, global HK, mild AoS.  Grade 1 diastolic dysfunction.   CAD, 6/21/2018 [facility] with single vessel CD and PCI of pLAD.  Occluded RCA with some left to right collaterals.  HTN; has home cuff.    Hyperlipidemia. statin  Smoker - quit 2018.  Hx of chantix.  Hx of enrollment smoking  cessation classes. Intermittent use of inhaler.  previously ordered lung CT for screening.  Hx of depression, paxil.  Stopped about 2015.  Colonoscopy due 2019 by report.  Pre-DM with hx of a1c 6.0.    Seen by cards after LHC and stent.  ASA and plavix; coreg, losrtan. Simva.  4/2018 lipid good LDL < 100  4/2018 a1c 5.5.    Needs pneumonia shot, AAA screening; AAA ordered.    No pain.  No sx of MI previous. Sometimes dizziness.  Brings in log of BP - systolic in the 110s.  No chest pain, shortness of breath.  Yes occasional dizziness.  Finds it bearable.    No abdominal pain, notes chronic constipation.  Notes polyuria without incontinence.    Stopped smoking with hospital admission.  Hx of nicotine replacement without relief.     Patient Care Team:  Charles Hwang MD as PCP - General (Internal Medicine)  Elsa Simental MD as PCP - Zora OLGUIN/Michele Attributed    Patient Active Problem List    Diagnosis Date Noted    Coronary artery disease involving native coronary artery with angina pectoris s/p JOSÉ LAD 6/2018 06/22/2018 6/21/18 LHC - LAD mid 60%, D1 small 90% proximal, Cx mild diffuse disease, % proximal - distal vessel fills with some left to right collaterals. Attempted RHC - right % before IVC  IFR guided PCI of the LAD  Proximal 70-80% lesion which was eccentric and hazy.  IFR was equal to 0.86.  Then placed a 3 x 22 mm resolute drug-eluting stent.  We post dilated the proximal portion with a 3.5 x 12 mm noncompliant balloon.  Good result was achieved with NOMI-3 flow through the vessel.       Chest pain, atypical 06/13/2018    Chronic systolic heart failure 6/2018 TTE LVEF 30-35% 06/04/2018    Nonrheumatic aortic valve stenosis; mild stenosis on TTE 6/2018 06/04/2018    Abnormal glucose 06/08/2017    Smoker 02/07/2017    Hyperlipidemia 09/19/2012    Essential hypertension 08/06/2012     Stable and controlled on ARB.          PAST MEDICAL HISTORY:  Past Medical History:    Diagnosis Date    Hyperlipidemia     Hypertension        PAST SURGICAL HISTORY:  Past Surgical History:   Procedure Laterality Date    APPENDECTOMY      CATHETERIZATION OF BOTH LEFT AND RIGHT HEART N/A 6/21/2018    Procedure: Heart Cath-Bilateral;  Surgeon: Kranthi Enegl MD;  Location: [facility] CATH LAB;  Service: Cardiology;  Laterality: N/A;  RN PREOP 6/19/2018       SOCIAL HISTORY:  Social History     Social History    Marital status:      Spouse name: N/A    Number of children: N/A    Years of education: N/A     Occupational History    guard at the [employer]      Social History Main Topics    Smoking status: Former Smoker     Packs/day: 1.00     Years: 50.00     Types: Cigarettes     Quit date: 2/2/2017    Smokeless tobacco: Current User    Alcohol use 8.4 oz/week     14 Cans of beer per week    Drug use: No    Sexual activity: Yes     Partners: Female     Other Topics Concern    Not on file     Social History Narrative    No narrative on file       ALLERGIES AND MEDICATIONS: updated and reviewed.  Review of patient's allergies indicates:   Allergen Reactions    Penicillins      Other reaction(s): Hives,itching     Current Outpatient Prescriptions   Medication Sig Dispense Refill    aspirin 325 MG tablet Take 1 tablet (325 mg total) by mouth once daily.  0    carvedilol (COREG) 3.125 MG tablet Take 1 tablet (3.125 mg total) by mouth 2 (two) times daily with meals. 60 tablet 11    clopidogrel (PLAVIX) 75 mg tablet Take 1 tablet (75 mg total) by mouth once daily. 90 tablet 3    losartan (COZAAR) 100 MG tablet Take 1 tablet (100 mg total) by mouth once daily. 90 tablet 3    losartan (COZAAR) 100 MG tablet TAKE 1 TABLET(100 MG) BY MOUTH EVERY DAY 90 tablet 0    simvastatin (ZOCOR) 20 MG tablet Take 1 tablet (20 mg total) by mouth every evening. 90 tablet 3     No current facility-administered medications for this visit.        Review of Systems   Constitutional: Negative for  chills and fever.   Respiratory: Negative for shortness of breath.    Cardiovascular: Negative for chest pain and palpitations.       Objective:   Physical Exam   Vitals:    07/19/18 1007   BP: 120/74   BP Location: Left arm   Patient Position: Sitting   BP Method: Medium (Manual)   Pulse: 72    There is no height or weight on file to calculate BMI.            Physical Exam   Constitutional: He is oriented to person, place, and time. He appears well-developed and well-nourished. No distress.   Eyes: EOM are normal.   Cardiovascular: Normal rate, regular rhythm and normal heart sounds.    2/6 systolic murmur   Pulmonary/Chest: Effort normal and breath sounds normal.   Musculoskeletal: Normal range of motion.   Neurological: He is alert and oriented to person, place, and time. Coordination normal.   Skin: Skin is warm and dry.   Psychiatric: He has a normal mood and affect. His behavior is normal. Thought content normal.

## 2018-07-19 ENCOUNTER — OFFICE VISIT (OUTPATIENT)
Dept: FAMILY MEDICINE | Facility: CLINIC | Age: 63
End: 2018-07-19
Payer: MEDICARE

## 2018-07-19 VITALS — SYSTOLIC BLOOD PRESSURE: 120 MMHG | DIASTOLIC BLOOD PRESSURE: 74 MMHG | HEART RATE: 72 BPM

## 2018-07-19 DIAGNOSIS — I25.119 CORONARY ARTERY DISEASE INVOLVING NATIVE CORONARY ARTERY OF NATIVE HEART WITH ANGINA PECTORIS: Primary | ICD-10-CM

## 2018-07-19 DIAGNOSIS — Z23 NEED FOR VACCINATION FOR STREP PNEUMONIAE: ICD-10-CM

## 2018-07-19 PROBLEM — R07.89 CHEST PAIN, ATYPICAL: Status: RESOLVED | Noted: 2018-06-13 | Resolved: 2018-07-19

## 2018-07-19 PROBLEM — Z87.891 FORMER SMOKER: Status: ACTIVE | Noted: 2017-02-07

## 2018-07-19 PROCEDURE — 3078F DIAST BP <80 MM HG: CPT | Mod: CPTII,S$GLB,, | Performed by: INTERNAL MEDICINE

## 2018-07-19 PROCEDURE — G0009 ADMIN PNEUMOCOCCAL VACCINE: HCPCS | Mod: S$GLB,,, | Performed by: INTERNAL MEDICINE

## 2018-07-19 PROCEDURE — 99999 PR PBB SHADOW E&M-EST. PATIENT-LVL II: CPT | Mod: PBBFAC,,, | Performed by: INTERNAL MEDICINE

## 2018-07-19 PROCEDURE — 99214 OFFICE O/P EST MOD 30 MIN: CPT | Mod: S$GLB,,, | Performed by: INTERNAL MEDICINE

## 2018-07-19 PROCEDURE — 3074F SYST BP LT 130 MM HG: CPT | Mod: CPTII,S$GLB,, | Performed by: INTERNAL MEDICINE

## 2018-07-19 PROCEDURE — 90670 PCV13 VACCINE IM: CPT | Mod: S$GLB,,, | Performed by: INTERNAL MEDICINE

## 2018-07-19 RX ORDER — ASPIRIN 81 MG/1
81 TABLET ORAL DAILY
Refills: 0 | COMMUNITY
Start: 2018-07-19 | End: 2020-01-10

## 2018-07-19 NOTE — PROGRESS NOTES
Pneumococcal vaccine 13 given tolerated well VIS given instructed to wait x 15 min for monitoring.

## 2018-07-19 NOTE — PATIENT INSTRUCTIONS
Please call the radiology department to schedule an abdominal aortic aneurysm screening ultrasound.

## 2018-07-20 ENCOUNTER — CLINICAL SUPPORT (OUTPATIENT)
Dept: SMOKING CESSATION | Facility: CLINIC | Age: 63
End: 2018-07-20
Payer: COMMERCIAL

## 2018-07-20 DIAGNOSIS — F17.200 NICOTINE DEPENDENCE: Primary | ICD-10-CM

## 2018-07-20 PROCEDURE — 99407 BEHAV CHNG SMOKING > 10 MIN: CPT | Mod: S$GLB,,, | Performed by: INTERNAL MEDICINE

## 2018-07-20 NOTE — PROGRESS NOTES
Spoke with patient today in regard to smoking cessation progress for 6 month follow up, he states not tobacco free at this time. Patient has scheduled an appointment to return to the program for Quit attempt  #6 on 7/23/18 @ 8:00 am. Informed patient of benefit period, future follow ups, and contact information if any further help or support is needed. Will resolve episode and complete smart form for Quit attempt #5.

## 2018-10-05 ENCOUNTER — HOSPITAL ENCOUNTER (OUTPATIENT)
Dept: CARDIOLOGY | Facility: HOSPITAL | Age: 63
Discharge: HOME | End: 2018-10-05
Attending: INTERNAL MEDICINE
Payer: MEDICARE

## 2018-10-05 VITALS — WEIGHT: 202 LBS | BODY MASS INDEX: 28.28 KG/M2 | HEIGHT: 71 IN

## 2018-10-05 DIAGNOSIS — I50.22 CHRONIC SYSTOLIC HEART FAILURE: ICD-10-CM

## 2018-10-05 DIAGNOSIS — I35.0 NONRHEUMATIC AORTIC VALVE STENOSIS: ICD-10-CM

## 2018-10-05 DIAGNOSIS — R07.89 CHEST PAIN, ATYPICAL: ICD-10-CM

## 2018-10-05 DIAGNOSIS — I25.119 CORONARY ARTERY DISEASE INVOLVING NATIVE CORONARY ARTERY OF NATIVE HEART WITH ANGINA PECTORIS: ICD-10-CM

## 2018-10-05 DIAGNOSIS — E78.5 HYPERLIPIDEMIA, UNSPECIFIED HYPERLIPIDEMIA TYPE: ICD-10-CM

## 2018-10-05 DIAGNOSIS — I10 ESSENTIAL HYPERTENSION: ICD-10-CM

## 2018-10-05 LAB
AV MEAN GRADIENT: 10.55 MMHG
AV PEAK GRADIENT: 18.81 MMHG
AV VALVE AREA: 1.5 CM2
BSA FOR ECHO PROCEDURE: 2.14 M2
CV ECHO LV RWT: 0.53 CM
DOP CALC AO PEAK VEL: 2.17 M/S
DOP CALC AO VTI: 49.89 CM
DOP CALC LVOT AREA: 3.36 CM2
DOP CALC LVOT DIAMETER: 2.07 CM
DOP CALC LVOT STROKE VOLUME: 74.61 CM3
DOP CALCLVOT PEAK VEL VTI: 22.18 CM
E WAVE DECELERATION TIME: 316.64 MSEC
E/A RATIO: 0.83
E/E' RATIO: 8.8
ECHO LV POSTERIOR WALL: 1.21 CM (ref 0.6–1.1)
FRACTIONAL SHORTENING: 27 % (ref 28–44)
INTERVENTRICULAR SEPTUM: 1.09 CM (ref 0.6–1.1)
IVRT: 0.13 MSEC
LA MAJOR: 5.06 CM
LA MINOR: 5.02 CM
LA WIDTH: 3.33 CM
LEFT ATRIUM SIZE: 2.91 CM
LEFT ATRIUM VOLUME INDEX: 19.4 ML/M2
LEFT ATRIUM VOLUME: 41.51 CM3
LEFT INTERNAL DIMENSION IN SYSTOLE: 3.14 CM (ref 2.1–4)
LEFT VENTRICLE DIASTOLIC VOLUME INDEX: 39.31 ML/M2
LEFT VENTRICLE DIASTOLIC VOLUME: 84.12 ML
LEFT VENTRICLE MASS INDEX: 81.7 G/M2
LEFT VENTRICLE SYSTOLIC VOLUME INDEX: 18.3 ML/M2
LEFT VENTRICLE SYSTOLIC VOLUME: 39.14 ML
LEFT VENTRICULAR INTERNAL DIMENSION IN DIASTOLE: 4.32 CM (ref 3.5–6)
LEFT VENTRICULAR MASS: 174.9 G
LV LATERAL E/E' RATIO: 7.33
LV SEPTAL E/E' RATIO: 11
MV PEAK A VEL: 0.8 M/S
MV PEAK E VEL: 0.66 M/S
MV STENOSIS PRESSURE HALF TIME: 91.82 MS
MV VALVE AREA P 1/2 METHOD: 2.4 CM2
PISA TR MAX VEL: 1.74 M/S
PULM VEIN S/D RATIO: 1.06
PV PEAK D VEL: 0.49 M/S
PV PEAK GRADIENT: 3.33 MMHG
PV PEAK S VEL: 0.52 M/S
PV PEAK VELOCITY: 0.91 CM/S
RA MAJOR: 3.71 CM
RA WIDTH: 3.53 CM
RETIRED EF AND QEF - SEE NOTES: 53.47 %
RIGHT VENTRICULAR END-DIASTOLIC DIMENSION: 2.54 CM
SINUS: 2.9 CM
STJ: 2.73 CM
TDI LATERAL: 0.09
TDI SEPTAL: 0.06
TDI: 0.08
TR MAX PG: 12.11 MMHG
TRICUSPID ANNULAR PLANE SYSTOLIC EXCURSION: 0.02 CM

## 2018-10-05 PROCEDURE — 93306 TTE W/DOPPLER COMPLETE: CPT

## 2018-10-05 PROCEDURE — 93306 TTE W/DOPPLER COMPLETE: CPT | Mod: 26,,, | Performed by: INTERNAL MEDICINE

## 2018-10-17 ENCOUNTER — OFFICE VISIT (OUTPATIENT)
Dept: CARDIOLOGY | Facility: CLINIC | Age: 63
End: 2018-10-17
Payer: MEDICARE

## 2018-10-17 VITALS
HEIGHT: 71 IN | HEART RATE: 73 BPM | BODY MASS INDEX: 28.02 KG/M2 | SYSTOLIC BLOOD PRESSURE: 130 MMHG | DIASTOLIC BLOOD PRESSURE: 72 MMHG | OXYGEN SATURATION: 97 % | WEIGHT: 200.19 LBS

## 2018-10-17 DIAGNOSIS — I25.119 CORONARY ARTERY DISEASE INVOLVING NATIVE CORONARY ARTERY OF NATIVE HEART WITH ANGINA PECTORIS: ICD-10-CM

## 2018-10-17 DIAGNOSIS — I10 ESSENTIAL HYPERTENSION: Primary | ICD-10-CM

## 2018-10-17 DIAGNOSIS — I35.0 NONRHEUMATIC AORTIC VALVE STENOSIS: ICD-10-CM

## 2018-10-17 DIAGNOSIS — I10 HYPERTENSION: ICD-10-CM

## 2018-10-17 DIAGNOSIS — I50.22 CHRONIC SYSTOLIC HEART FAILURE: ICD-10-CM

## 2018-10-17 DIAGNOSIS — E78.00 PURE HYPERCHOLESTEROLEMIA: ICD-10-CM

## 2018-10-17 PROCEDURE — 3078F DIAST BP <80 MM HG: CPT | Mod: CPTII,,, | Performed by: INTERNAL MEDICINE

## 2018-10-17 PROCEDURE — 93010 ELECTROCARDIOGRAM REPORT: CPT | Mod: S$PBB,,, | Performed by: INTERNAL MEDICINE

## 2018-10-17 PROCEDURE — 3008F BODY MASS INDEX DOCD: CPT | Mod: CPTII,,, | Performed by: INTERNAL MEDICINE

## 2018-10-17 PROCEDURE — 99213 OFFICE O/P EST LOW 20 MIN: CPT | Mod: S$PBB,,, | Performed by: INTERNAL MEDICINE

## 2018-10-17 PROCEDURE — 93005 ELECTROCARDIOGRAM TRACING: CPT | Mod: PBBFAC | Performed by: INTERNAL MEDICINE

## 2018-10-17 PROCEDURE — 3075F SYST BP GE 130 - 139MM HG: CPT | Mod: CPTII,,, | Performed by: INTERNAL MEDICINE

## 2018-10-17 PROCEDURE — 1101F PT FALLS ASSESS-DOCD LE1/YR: CPT | Mod: CPTII,,, | Performed by: INTERNAL MEDICINE

## 2018-10-17 PROCEDURE — 99213 OFFICE O/P EST LOW 20 MIN: CPT | Mod: PBBFAC | Performed by: INTERNAL MEDICINE

## 2018-10-17 PROCEDURE — 99999 PR PBB SHADOW E&M-EST. PATIENT-LVL III: CPT | Mod: PBBFAC,,, | Performed by: INTERNAL MEDICINE

## 2018-10-17 NOTE — PROGRESS NOTES
Subjective:    Patient ID:  Rubne Cardenas is a 63 y.o. male who presents for follow-up of Results      HPI     CAD - JOSÉ to LAD 6/21/18 - %, Ischemic CM - EF 35-40%, HTN, HLD, tobacco abuse     Referred by NP  This 63 y.o. presents today for complaint of physical exam and refills.  Patient has hypertension hyperlipidemia and hyperglycemia and a murmur.  Taking meds as directed.  He has managed to cut back on the smoking.  He had quit briefly.  He remains physically active.  Doesn't follow any particular diet.    Echo 10/5/18  · TDS  · Left ventricle ejection fraction is mildly decreased at 45%, mild global hypokinesis  · Left ventricle shows concentric remodeling.  · Grade I (mild) left ventricular diastolic dysfunction consistent with impaired relaxation.  · There is mild-to-moderate aortic valve stenosis.  · RUCHI is 1.50 cm2; peak velocity is 2.17 m/s; mean gradient is 10.55 mmHg.       Echo 5/25/18    1 - Moderately depressed left ventricular systolic function (EF 35-40%).  Global hypokinesis.     2 - Concentric remodeling.     3 - Impaired LV relaxation, normal LAP (grade 1 diastolic dysfunction).     4 - Mild aortic stenosis, RUCHI = 1.63 cm2, AVAi = 0.77 cm2/m2, peak velocity = 2.19 m/s, mean gradient = 12 mmHg.      6/21/18 LHC - LAD mid 60%, D1 small 90% proximal, Cx mild diffuse disease, % proximal - distal vessel fills with some left to right collaterals. Attempted RHC - right % before IVC     Description of Procedure: IFR guided PCI of the LAD     Description of the findings of the procedure: Proximal 70-80% lesion which was eccentric and hazy.  IFR was equal to 0.86.  Then placed a 3 x 22 mm resolute drug-eluting stent.  We post dilated the proximal portion with a 3.5 x 12 mm noncompliant balloon.  Good result was achieved with NOMI-3 flow through the vessel.  Please see Dr. Engel is no for full details of diagnostic portion of the catheterization.     6/13/18 Reports fatigue and MENCHACA  for the last year  Gets occasional pain in both shoulders  EKG sinus tachycardia 102 possible old IMI    Denies CP or SOB  EKG NSR - ok  Trying to quit smoking        Review of Systems   Constitution: Negative for decreased appetite.   HENT: Negative for ear discharge.    Eyes: Negative for blurred vision.   Endocrine: Negative for polyphagia.   Skin: Negative for nail changes.   Neurological: Negative for aphonia.   Psychiatric/Behavioral: Negative for hallucinations.        Objective:    Physical Exam   Constitutional: He is oriented to person, place, and time. He appears well-developed and well-nourished.   HENT:   Head: Normocephalic and atraumatic.   Eyes: Conjunctivae are normal. Pupils are equal, round, and reactive to light.   Neck: Normal range of motion. Neck supple.   Cardiovascular: Normal rate, normal heart sounds and intact distal pulses.   Pulmonary/Chest: Effort normal and breath sounds normal.   Abdominal: Soft. Bowel sounds are normal.   Musculoskeletal: Normal range of motion.   Neurological: He is alert and oriented to person, place, and time.   Skin: Skin is warm and dry.         Assessment:       1. Essential hypertension    2. Pure hypercholesterolemia    3. Chronic systolic heart failure 6/2018 TTE LVEF 30-35%    4. Nonrheumatic aortic valve stenosis; mild stenosis on TTE 6/2018    5. Coronary artery disease involving native coronary artery of native heart with angina pectoris         Plan:       Cardiac stable  OV 6 months  EF improved some on echo

## 2018-11-06 ENCOUNTER — CLINICAL SUPPORT (OUTPATIENT)
Dept: SMOKING CESSATION | Facility: CLINIC | Age: 63
End: 2018-11-06
Payer: COMMERCIAL

## 2018-11-06 DIAGNOSIS — F17.200 NICOTINE DEPENDENCE: Primary | ICD-10-CM

## 2018-11-06 PROCEDURE — 99404 PREV MED CNSL INDIV APPRX 60: CPT | Mod: S$GLB,,,

## 2018-11-06 PROCEDURE — 99999 PR PBB SHADOW E&M-EST. PATIENT-LVL I: CPT | Mod: PBBFAC,,,

## 2018-11-06 RX ORDER — DM/P-EPHED/ACETAMINOPH/DOXYLAM 30-7.5/3
2 LIQUID (ML) ORAL
Qty: 100 LOZENGE | Refills: 0 | Status: SHIPPED | OUTPATIENT
Start: 2018-11-06 | End: 2018-11-20 | Stop reason: SDUPTHER

## 2018-11-06 RX ORDER — IBUPROFEN 200 MG
1 TABLET ORAL DAILY
Qty: 14 PATCH | Refills: 0 | Status: SHIPPED | OUTPATIENT
Start: 2018-11-06 | End: 2018-11-20 | Stop reason: SDUPTHER

## 2018-11-06 RX ORDER — VARENICLINE TARTRATE 0.5 (11)-1
KIT ORAL
Qty: 1 PACKAGE | Refills: 0 | Status: SHIPPED | OUTPATIENT
Start: 2018-11-06 | End: 2018-12-04

## 2018-11-06 NOTE — Clinical Note
Continued: discussed strategies to help cut back and to help break the routine and habit associated with smoking, intake handout provided to the patient and discussed, all prescribed NRT discussed in depth as well as tobacco cessation medication s/e as well as usage discussed, contact card provided to the patient. Will continue to follow every two weeks while in the program.

## 2018-11-06 NOTE — Clinical Note
Patient presents for intake smoking 15-20 cigarettes per day, after assessment and discussion recommend the 21mg nicotine patch bid in conjunction with the 2mg nicotine lozenge as well as the chantix starter pack, recommend an abrupt quit

## 2018-11-20 ENCOUNTER — CLINICAL SUPPORT (OUTPATIENT)
Dept: SMOKING CESSATION | Facility: CLINIC | Age: 63
End: 2018-11-20
Payer: COMMERCIAL

## 2018-11-20 DIAGNOSIS — F17.200 NICOTINE DEPENDENCE: Primary | ICD-10-CM

## 2018-11-20 PROCEDURE — 99407 BEHAV CHNG SMOKING > 10 MIN: CPT | Mod: S$PBB,,,

## 2018-11-20 RX ORDER — DM/P-EPHED/ACETAMINOPH/DOXYLAM 30-7.5/3
2 LIQUID (ML) ORAL
Qty: 100 LOZENGE | Refills: 0 | Status: SHIPPED | OUTPATIENT
Start: 2018-11-20 | End: 2018-12-04 | Stop reason: SDUPTHER

## 2018-11-20 RX ORDER — IBUPROFEN 200 MG
1 TABLET ORAL DAILY
Qty: 14 PATCH | Refills: 0 | Status: SHIPPED | OUTPATIENT
Start: 2018-11-20 | End: 2018-12-04 | Stop reason: SDUPTHER

## 2019-01-24 ENCOUNTER — TELEPHONE (OUTPATIENT)
Dept: SMOKING CESSATION | Facility: CLINIC | Age: 64
End: 2019-01-24

## 2019-02-06 ENCOUNTER — CLINICAL SUPPORT (OUTPATIENT)
Dept: SMOKING CESSATION | Facility: CLINIC | Age: 64
End: 2019-02-06
Payer: COMMERCIAL

## 2019-02-06 DIAGNOSIS — F17.200 NICOTINE DEPENDENCE: Primary | ICD-10-CM

## 2019-02-06 PROCEDURE — 99407 PR TOBACCO USE CESSATION INTENSIVE >10 MINUTES: ICD-10-PCS | Mod: S$GLB,,, | Performed by: INTERNAL MEDICINE

## 2019-02-06 PROCEDURE — 99407 BEHAV CHNG SMOKING > 10 MIN: CPT | Mod: S$GLB,,, | Performed by: INTERNAL MEDICINE

## 2019-02-06 NOTE — PROGRESS NOTES
Spoke with patient today in regard to smoking cessation progress for 3 month telephone follow up, he states not tobacco free. Patient has scheduled an appointment to return to the program for Quit attempt #7 on 2/12/2019 @ 2:00 pm. Informed patient of benefit period, future follow ups, and contact information if any further help or support is needed. Will resolve episode and complete smart form for Quit attempt #5 and 6.

## 2019-02-13 ENCOUNTER — CLINICAL SUPPORT (OUTPATIENT)
Dept: SMOKING CESSATION | Facility: CLINIC | Age: 64
End: 2019-02-13
Payer: COMMERCIAL

## 2019-02-13 DIAGNOSIS — F17.200 NICOTINE DEPENDENCE: Primary | ICD-10-CM

## 2019-02-13 PROCEDURE — 99407 BEHAV CHNG SMOKING > 10 MIN: CPT | Mod: S$GLB,,,

## 2019-02-13 PROCEDURE — 99407 PR TOBACCO USE CESSATION INTENSIVE >10 MINUTES: ICD-10-PCS | Mod: S$GLB,,,

## 2019-04-07 DIAGNOSIS — E78.5 HYPERLIPIDEMIA, UNSPECIFIED HYPERLIPIDEMIA TYPE: ICD-10-CM

## 2019-04-08 DIAGNOSIS — I10 ESSENTIAL HYPERTENSION: ICD-10-CM

## 2019-04-08 DIAGNOSIS — E78.5 HYPERLIPIDEMIA, UNSPECIFIED HYPERLIPIDEMIA TYPE: ICD-10-CM

## 2019-04-08 RX ORDER — SIMVASTATIN 20 MG/1
TABLET, FILM COATED ORAL
Qty: 90 TABLET | Refills: 0 | Status: SHIPPED | OUTPATIENT
Start: 2019-04-08 | End: 2020-01-10 | Stop reason: SDUPTHER

## 2019-04-08 RX ORDER — CLOPIDOGREL BISULFATE 75 MG/1
75 TABLET ORAL DAILY
Qty: 90 TABLET | Refills: 3 | Status: SHIPPED | OUTPATIENT
Start: 2019-04-08 | End: 2019-07-08 | Stop reason: SDUPTHER

## 2019-04-22 ENCOUNTER — OFFICE VISIT (OUTPATIENT)
Dept: CARDIOLOGY | Facility: CLINIC | Age: 64
End: 2019-04-22
Payer: MEDICARE

## 2019-04-22 VITALS
SYSTOLIC BLOOD PRESSURE: 115 MMHG | OXYGEN SATURATION: 97 % | WEIGHT: 200.63 LBS | HEIGHT: 71 IN | HEART RATE: 92 BPM | BODY MASS INDEX: 28.09 KG/M2 | DIASTOLIC BLOOD PRESSURE: 62 MMHG

## 2019-04-22 DIAGNOSIS — I25.119 CORONARY ARTERY DISEASE INVOLVING NATIVE CORONARY ARTERY OF NATIVE HEART WITH ANGINA PECTORIS: ICD-10-CM

## 2019-04-22 DIAGNOSIS — I50.22 CHRONIC SYSTOLIC HEART FAILURE: Primary | ICD-10-CM

## 2019-04-22 DIAGNOSIS — I35.0 NONRHEUMATIC AORTIC VALVE STENOSIS: ICD-10-CM

## 2019-04-22 DIAGNOSIS — I10 ESSENTIAL HYPERTENSION: ICD-10-CM

## 2019-04-22 DIAGNOSIS — E78.00 PURE HYPERCHOLESTEROLEMIA: ICD-10-CM

## 2019-04-22 DIAGNOSIS — I10 HYPERTENSION: ICD-10-CM

## 2019-04-22 PROCEDURE — 93000 EKG 12-LEAD: ICD-10-PCS | Mod: HCNC,S$GLB,, | Performed by: INTERNAL MEDICINE

## 2019-04-22 PROCEDURE — 99999 PR PBB SHADOW E&M-EST. PATIENT-LVL III: ICD-10-PCS | Mod: PBBFAC,HCNC,, | Performed by: INTERNAL MEDICINE

## 2019-04-22 PROCEDURE — 99999 PR PBB SHADOW E&M-EST. PATIENT-LVL III: CPT | Mod: PBBFAC,HCNC,, | Performed by: INTERNAL MEDICINE

## 2019-04-22 PROCEDURE — 3074F PR MOST RECENT SYSTOLIC BLOOD PRESSURE < 130 MM HG: ICD-10-PCS | Mod: HCNC,CPTII,S$GLB, | Performed by: INTERNAL MEDICINE

## 2019-04-22 PROCEDURE — 99214 PR OFFICE/OUTPT VISIT, EST, LEVL IV, 30-39 MIN: ICD-10-PCS | Mod: HCNC,S$GLB,, | Performed by: INTERNAL MEDICINE

## 2019-04-22 PROCEDURE — 3074F SYST BP LT 130 MM HG: CPT | Mod: HCNC,CPTII,S$GLB, | Performed by: INTERNAL MEDICINE

## 2019-04-22 PROCEDURE — 3078F PR MOST RECENT DIASTOLIC BLOOD PRESSURE < 80 MM HG: ICD-10-PCS | Mod: HCNC,CPTII,S$GLB, | Performed by: INTERNAL MEDICINE

## 2019-04-22 PROCEDURE — 93000 ELECTROCARDIOGRAM COMPLETE: CPT | Mod: HCNC,S$GLB,, | Performed by: INTERNAL MEDICINE

## 2019-04-22 PROCEDURE — 99214 OFFICE O/P EST MOD 30 MIN: CPT | Mod: HCNC,S$GLB,, | Performed by: INTERNAL MEDICINE

## 2019-04-22 PROCEDURE — 3078F DIAST BP <80 MM HG: CPT | Mod: HCNC,CPTII,S$GLB, | Performed by: INTERNAL MEDICINE

## 2019-04-22 PROCEDURE — 1101F PR PT FALLS ASSESS DOC 0-1 FALLS W/OUT INJ PAST YR: ICD-10-PCS | Mod: HCNC,CPTII,S$GLB, | Performed by: INTERNAL MEDICINE

## 2019-04-22 PROCEDURE — 1101F PT FALLS ASSESS-DOCD LE1/YR: CPT | Mod: HCNC,CPTII,S$GLB, | Performed by: INTERNAL MEDICINE

## 2019-04-22 NOTE — PROGRESS NOTES
Subjective:    Patient ID:  Ruben Cardenas is a 64 y.o. male who presents for follow-up of Hypertension      HPI     CAD - JOSÉ to LAD 6/21/18 - %, Ischemic CM - EF 35-40%, HTN, HLD, tobacco abuse     Referred by NP  This 63 y.o. presents today for complaint of physical exam and refills.  Patient has hypertension hyperlipidemia and hyperglycemia and a murmur.  Taking meds as directed.  He has managed to cut back on the smoking.  He had quit briefly.  He remains physically active.  Doesn't follow any particular diet.     Echo 10/5/18  · TDS  · Left ventricle ejection fraction is mildly decreased at 45%, mild global hypokinesis  · Left ventricle shows concentric remodeling.  · Grade I (mild) left ventricular diastolic dysfunction consistent with impaired relaxation.  · There is mild-to-moderate aortic valve stenosis.  · RUCHI is 1.50 cm2; peak velocity is 2.17 m/s; mean gradient is 10.55 mmHg.        Echo 5/25/18    1 - Moderately depressed left ventricular systolic function (EF 35-40%).  Global hypokinesis.     2 - Concentric remodeling.     3 - Impaired LV relaxation, normal LAP (grade 1 diastolic dysfunction).     4 - Mild aortic stenosis, RUCHI = 1.63 cm2, AVAi = 0.77 cm2/m2, peak velocity = 2.19 m/s, mean gradient = 12 mmHg.      6/21/18 LHC - LAD mid 60%, D1 small 90% proximal, Cx mild diffuse disease, % proximal - distal vessel fills with some left to right collaterals. Attempted RHC - right % before IVC     Description of Procedure: IFR guided PCI of the LAD     Description of the findings of the procedure: Proximal 70-80% lesion which was eccentric and hazy.  IFR was equal to 0.86.  Then placed a 3 x 22 mm resolute drug-eluting stent.  We post dilated the proximal portion with a 3.5 x 12 mm noncompliant balloon.  Good result was achieved with NOMI-3 flow through the vessel.  Please see Dr. Engel is no for full details of diagnostic portion of the catheterization.     6/13/18 Reports fatigue  and MENCHACA for the last year  Gets occasional pain in both shoulders  EKG sinus tachycardia 102 possible old IMI     10/17/18 Denies CP or SOB  EKG NSR - ok  Trying to quit smoking    Denies CP or SOB  Quit smoking  EKG NSR - ok      Review of Systems   Constitution: Negative for decreased appetite.   HENT: Negative for ear discharge.    Eyes: Negative for blurred vision.   Endocrine: Negative for polyphagia.   Skin: Negative for nail changes.   Genitourinary: Negative for bladder incontinence.   Neurological: Negative for aphonia.   Psychiatric/Behavioral: Negative for hallucinations.   Allergic/Immunologic: Negative for hives.        Objective:    Physical Exam   Constitutional: He is oriented to person, place, and time. He appears well-developed and well-nourished.   HENT:   Head: Normocephalic and atraumatic.   Eyes: Pupils are equal, round, and reactive to light. Conjunctivae are normal.   Neck: Normal range of motion. Neck supple.   Cardiovascular: Normal rate, normal heart sounds and intact distal pulses.   Pulmonary/Chest: Effort normal and breath sounds normal.   Abdominal: Soft. Bowel sounds are normal.   Musculoskeletal: Normal range of motion.   Neurological: He is alert and oriented to person, place, and time.   Skin: Skin is warm and dry.         Assessment:       1. Chronic systolic heart failure 6/2018 TTE LVEF 30-35%    2. Nonrheumatic aortic valve stenosis; mild stenosis on TTE 6/2018    3. Coronary artery disease involving native coronary artery of native heart with angina pectoris    4. Essential hypertension    5. Pure hypercholesterolemia         Plan:       Cardiac stable  OV 6 months with echo and lexiscan myoview

## 2019-05-30 ENCOUNTER — CLINICAL SUPPORT (OUTPATIENT)
Dept: SMOKING CESSATION | Facility: CLINIC | Age: 64
End: 2019-05-30
Payer: COMMERCIAL

## 2019-05-30 DIAGNOSIS — F17.200 NICOTINE DEPENDENCE: Primary | ICD-10-CM

## 2019-05-30 PROCEDURE — 99407 PR TOBACCO USE CESSATION INTENSIVE >10 MINUTES: ICD-10-PCS | Mod: S$GLB,,, | Performed by: INTERNAL MEDICINE

## 2019-05-30 PROCEDURE — 99407 BEHAV CHNG SMOKING > 10 MIN: CPT | Mod: S$GLB,,, | Performed by: INTERNAL MEDICINE

## 2019-05-30 NOTE — PROGRESS NOTES
Spoke with patient today in regard to smoking cessation progress for 3/6 month telephone follow up on quit attempt #6, he states not tobacco free. Patient states going out of town for a month to [place] and may return to the program when he returns. Informed patient of benefit period, future follow ups, and contact information if any further help or support is needed. Will resolve episode and complete smart form for Quit attempt #6 and 3 month follow up on Quit attempt #7.

## 2019-07-06 DIAGNOSIS — I10 ESSENTIAL HYPERTENSION: ICD-10-CM

## 2019-07-07 RX ORDER — CARVEDILOL 3.12 MG/1
TABLET ORAL
Qty: 180 TABLET | Refills: 3 | Status: SHIPPED | OUTPATIENT
Start: 2019-07-07 | End: 2020-01-10 | Stop reason: SDUPTHER

## 2019-07-08 RX ORDER — LOSARTAN POTASSIUM 100 MG/1
TABLET ORAL
Qty: 90 TABLET | Refills: 1 | Status: SHIPPED | OUTPATIENT
Start: 2019-07-08 | End: 2020-01-02

## 2019-07-08 RX ORDER — CLOPIDOGREL BISULFATE 75 MG/1
75 TABLET ORAL DAILY
Qty: 90 TABLET | Refills: 3 | Status: SHIPPED | OUTPATIENT
Start: 2019-07-08 | End: 2020-01-10 | Stop reason: SDUPTHER

## 2019-07-08 RX ORDER — CLOPIDOGREL BISULFATE 75 MG/1
75 TABLET ORAL DAILY
Qty: 90 TABLET | Refills: 3 | Status: SHIPPED | OUTPATIENT
Start: 2019-07-08 | End: 2019-07-08 | Stop reason: SDUPTHER

## 2019-07-08 NOTE — TELEPHONE ENCOUNTER
----- Message from Larissa Lozano sent at 7/8/2019  8:28 AM CDT -----  Contact: ADIEL CRAMER [[id number]]      Can the clinic reply in [facility]: no      Please refill the medication(s) listed below. Please call the patient when the prescription(s) is ready for  at this phone number [phone]    Medication #1  clopidogrel (PLAVIX) 75 mg tablet      Preferred Pharmacy:   [facility] [address] [place] [address] AT [address] [address]  Phone: [phone] Fax: [phone]

## 2019-11-21 ENCOUNTER — CLINICAL SUPPORT (OUTPATIENT)
Dept: SMOKING CESSATION | Facility: CLINIC | Age: 64
End: 2019-11-21
Payer: COMMERCIAL

## 2019-11-21 DIAGNOSIS — F17.200 NICOTINE DEPENDENCE: Primary | ICD-10-CM

## 2019-11-21 PROCEDURE — 99407 PR TOBACCO USE CESSATION INTENSIVE >10 MINUTES: ICD-10-PCS | Mod: S$GLB,,,

## 2019-11-21 PROCEDURE — 99407 BEHAV CHNG SMOKING > 10 MIN: CPT | Mod: S$GLB,,,

## 2020-01-02 DIAGNOSIS — E78.00 PURE HYPERCHOLESTEROLEMIA: ICD-10-CM

## 2020-01-02 DIAGNOSIS — I10 ESSENTIAL HYPERTENSION: ICD-10-CM

## 2020-01-02 DIAGNOSIS — R73.09 ABNORMAL GLUCOSE: Primary | ICD-10-CM

## 2020-01-02 RX ORDER — LOSARTAN POTASSIUM 100 MG/1
TABLET ORAL
Qty: 90 TABLET | Refills: 0 | Status: SHIPPED | OUTPATIENT
Start: 2020-01-02 | End: 2020-03-19 | Stop reason: SDUPTHER

## 2020-01-02 NOTE — TELEPHONE ENCOUNTER
Tried calling pt. no answer, unable to leave message. Pt. Needs appt with labs before refill runs out

## 2020-01-10 ENCOUNTER — OFFICE VISIT (OUTPATIENT)
Dept: FAMILY MEDICINE | Facility: CLINIC | Age: 65
End: 2020-01-10
Payer: MEDICARE

## 2020-01-10 VITALS
HEART RATE: 61 BPM | DIASTOLIC BLOOD PRESSURE: 68 MMHG | OXYGEN SATURATION: 95 % | HEIGHT: 71 IN | BODY MASS INDEX: 27.16 KG/M2 | WEIGHT: 194 LBS | SYSTOLIC BLOOD PRESSURE: 138 MMHG | TEMPERATURE: 97 F

## 2020-01-10 DIAGNOSIS — I50.22 CHRONIC SYSTOLIC HEART FAILURE: ICD-10-CM

## 2020-01-10 DIAGNOSIS — I10 ESSENTIAL HYPERTENSION: Primary | ICD-10-CM

## 2020-01-10 DIAGNOSIS — Z13.6 SCREENING FOR AAA (ABDOMINAL AORTIC ANEURYSM): ICD-10-CM

## 2020-01-10 DIAGNOSIS — Z23 NEED FOR IMMUNIZATION AGAINST INFLUENZA: ICD-10-CM

## 2020-01-10 DIAGNOSIS — E78.5 HYPERLIPIDEMIA, UNSPECIFIED HYPERLIPIDEMIA TYPE: ICD-10-CM

## 2020-01-10 DIAGNOSIS — I25.119 CORONARY ARTERY DISEASE INVOLVING NATIVE CORONARY ARTERY OF NATIVE HEART WITH ANGINA PECTORIS: ICD-10-CM

## 2020-01-10 DIAGNOSIS — Z12.9 SCREENING FOR CANCER: ICD-10-CM

## 2020-01-10 DIAGNOSIS — F17.210 NICOTINE DEPENDENCE, CIGARETTES, UNCOMPLICATED: ICD-10-CM

## 2020-01-10 PROCEDURE — 99999 PR PBB SHADOW E&M-EST. PATIENT-LVL IV: ICD-10-PCS | Mod: PBBFAC,HCNC,, | Performed by: NURSE PRACTITIONER

## 2020-01-10 PROCEDURE — 1159F MED LIST DOCD IN RCRD: CPT | Mod: HCNC,S$GLB,, | Performed by: NURSE PRACTITIONER

## 2020-01-10 PROCEDURE — 90662 FLU VACCINE - HIGH DOSE (65+) PRESERVATIVE FREE IM: ICD-10-PCS | Mod: HCNC,S$GLB,, | Performed by: NURSE PRACTITIONER

## 2020-01-10 PROCEDURE — 1101F PT FALLS ASSESS-DOCD LE1/YR: CPT | Mod: HCNC,CPTII,S$GLB, | Performed by: NURSE PRACTITIONER

## 2020-01-10 PROCEDURE — G0008 FLU VACCINE - HIGH DOSE (65+) PRESERVATIVE FREE IM: ICD-10-PCS | Mod: HCNC,S$GLB,, | Performed by: NURSE PRACTITIONER

## 2020-01-10 PROCEDURE — 1159F PR MEDICATION LIST DOCUMENTED IN MEDICAL RECORD: ICD-10-PCS | Mod: HCNC,S$GLB,, | Performed by: NURSE PRACTITIONER

## 2020-01-10 PROCEDURE — 99499 UNLISTED E&M SERVICE: CPT | Mod: HCNC,S$GLB,, | Performed by: NURSE PRACTITIONER

## 2020-01-10 PROCEDURE — G0008 ADMIN INFLUENZA VIRUS VAC: HCPCS | Mod: HCNC,S$GLB,, | Performed by: NURSE PRACTITIONER

## 2020-01-10 PROCEDURE — 99999 PR PBB SHADOW E&M-EST. PATIENT-LVL IV: CPT | Mod: PBBFAC,HCNC,, | Performed by: NURSE PRACTITIONER

## 2020-01-10 PROCEDURE — 3078F PR MOST RECENT DIASTOLIC BLOOD PRESSURE < 80 MM HG: ICD-10-PCS | Mod: HCNC,CPTII,S$GLB, | Performed by: NURSE PRACTITIONER

## 2020-01-10 PROCEDURE — 99214 OFFICE O/P EST MOD 30 MIN: CPT | Mod: 25,HCNC,S$GLB, | Performed by: NURSE PRACTITIONER

## 2020-01-10 PROCEDURE — 3075F PR MOST RECENT SYSTOLIC BLOOD PRESS GE 130-139MM HG: ICD-10-PCS | Mod: HCNC,CPTII,S$GLB, | Performed by: NURSE PRACTITIONER

## 2020-01-10 PROCEDURE — 1126F PR PAIN SEVERITY QUANTIFIED, NO PAIN PRESENT: ICD-10-PCS | Mod: HCNC,S$GLB,, | Performed by: NURSE PRACTITIONER

## 2020-01-10 PROCEDURE — 99214 PR OFFICE/OUTPT VISIT, EST, LEVL IV, 30-39 MIN: ICD-10-PCS | Mod: 25,HCNC,S$GLB, | Performed by: NURSE PRACTITIONER

## 2020-01-10 PROCEDURE — 1101F PR PT FALLS ASSESS DOC 0-1 FALLS W/OUT INJ PAST YR: ICD-10-PCS | Mod: HCNC,CPTII,S$GLB, | Performed by: NURSE PRACTITIONER

## 2020-01-10 PROCEDURE — 1126F AMNT PAIN NOTED NONE PRSNT: CPT | Mod: HCNC,S$GLB,, | Performed by: NURSE PRACTITIONER

## 2020-01-10 PROCEDURE — 99499 RISK ADDL DX/OHS AUDIT: ICD-10-PCS | Mod: HCNC,S$GLB,, | Performed by: NURSE PRACTITIONER

## 2020-01-10 PROCEDURE — 90662 IIV NO PRSV INCREASED AG IM: CPT | Mod: HCNC,S$GLB,, | Performed by: NURSE PRACTITIONER

## 2020-01-10 PROCEDURE — 3078F DIAST BP <80 MM HG: CPT | Mod: HCNC,CPTII,S$GLB, | Performed by: NURSE PRACTITIONER

## 2020-01-10 PROCEDURE — 3075F SYST BP GE 130 - 139MM HG: CPT | Mod: HCNC,CPTII,S$GLB, | Performed by: NURSE PRACTITIONER

## 2020-01-10 RX ORDER — SIMVASTATIN 20 MG/1
TABLET, FILM COATED ORAL
Qty: 90 TABLET | Refills: 0 | Status: SHIPPED | OUTPATIENT
Start: 2020-01-10 | End: 2020-03-19 | Stop reason: SDUPTHER

## 2020-01-10 RX ORDER — CLOPIDOGREL BISULFATE 75 MG/1
75 TABLET ORAL DAILY
Qty: 90 TABLET | Refills: 3 | Status: SHIPPED | OUTPATIENT
Start: 2020-01-10 | End: 2020-08-07 | Stop reason: ALTCHOICE

## 2020-01-10 RX ORDER — LOSARTAN POTASSIUM 100 MG/1
TABLET ORAL
Qty: 90 TABLET | Refills: 0 | Status: SHIPPED | OUTPATIENT
Start: 2020-01-10 | End: 2020-08-07

## 2020-01-10 RX ORDER — CARVEDILOL 3.12 MG/1
TABLET ORAL
Qty: 180 TABLET | Refills: 3 | Status: SHIPPED | OUTPATIENT
Start: 2020-01-10 | End: 2020-03-19 | Stop reason: SDUPTHER

## 2020-02-03 NOTE — PROGRESS NOTES
Subjective:       Patient ID: Ruben Cardenas is a 64 y.o. male.    Chief Complaint: Medication Refill    This 63 y.o. presents today for complaint of physical exam and refills.  Patient has hypertension hyperlipidemia and hyperglycemia and a murmur.  Taking meds as directed.  He remains physically active.  Doesn't follow any particular diet.    Past Medical History:   Diagnosis Date    Hyperlipidemia     Hypertension      Past Surgical History:   Procedure Laterality Date    APPENDECTOMY      CATHETERIZATION OF BOTH LEFT AND RIGHT HEART N/A 6/21/2018    Procedure: Heart Cath-Bilateral;  Surgeon: Kranthi Engel MD;  Location: [facility] CATH LAB;  Service: Cardiology;  Laterality: N/A;  RN PREOP 6/19/2018     Social History     Socioeconomic History    Marital status:      Spouse name: Not on file    Number of children: Not on file    Years of education: Not on file    Highest education level: Not on file   Occupational History    Occupation: guard at the [employer]   Social Needs    Financial resource strain: Not on file    Food insecurity:     Worry: Not on file     Inability: Not on file    Transportation needs:     Medical: Not on file     Non-medical: Not on file   Tobacco Use    Smoking status: Former Smoker     Packs/day: 1.00     Years: 50.00     Pack years: 50.00     Types: Cigarettes     Last attempt to quit: 2/2/2017     Years since quitting: 3.0    Smokeless tobacco: Current User   Substance and Sexual Activity    Alcohol use: Yes     Alcohol/week: 14.0 standard drinks     Types: 14 Cans of beer per week    Drug use: No    Sexual activity: Yes     Partners: Female   Lifestyle    Physical activity:     Days per week: Not on file     Minutes per session: Not on file    Stress: Not on file   Relationships    Social connections:     Talks on phone: Not on file     Gets together: Not on file     Attends Jain service: Not on file     Active member of club or organization:  Not on file     Attends meetings of clubs or organizations: Not on file     Relationship status: Not on file   Other Topics Concern    Not on file   Social History Narrative    Not on file     Family History   Problem Relation Age of Onset    Heart disease Mother     Hyperlipidemia Mother     Rashes / Skin problems Mother     COPD Sister         smoker    Alcohol abuse Brother     Hypertension Sister     No Known Problems Sister     No Known Problems Son     No Known Problems Daughter        Review of Systems   Constitutional: Negative for chills, diaphoresis, fatigue and fever.   HENT: Negative for congestion, postnasal drip, rhinorrhea, sinus pressure and sneezing.    Respiratory: Negative for cough, chest tightness and shortness of breath.    Cardiovascular: Negative for chest pain, palpitations and leg swelling.   Gastrointestinal: Negative for abdominal pain, diarrhea, nausea and vomiting.   Genitourinary: Negative for decreased urine volume and difficulty urinating.   Musculoskeletal: Negative for arthralgias, back pain and myalgias.   Skin: Negative for color change and rash.   Neurological: Negative for dizziness, weakness, light-headedness and headaches.       Objective:      Physical Exam   Constitutional: He is oriented to person, place, and time. Vital signs are normal. He appears well-developed and well-nourished.   HENT:   Head: Normocephalic and atraumatic.   Right Ear: External ear normal.   Left Ear: External ear normal.   Nose: Nose normal.   Mouth/Throat: Oropharynx is clear and moist. No oropharyngeal exudate.   Cardiovascular: Normal rate, regular rhythm and normal heart sounds.   Pulmonary/Chest: Effort normal and breath sounds normal.   Abdominal: Soft. Bowel sounds are normal.   Neurological: He is alert and oriented to person, place, and time. Abnormal reflex: .coint. Cranial nerve deficit: .cont.   Skin: Skin is warm, dry and intact.   Psychiatric: He has a normal mood and  affect.       Assessment:       1. Essential hypertension    2. Hyperlipidemia, unspecified hyperlipidemia type    3. Screening for cancer    4. Need for immunization against influenza    5. Screening for AAA (abdominal aortic aneurysm)    6. Nicotine dependence, cigarettes, uncomplicated     7. Chronic systolic heart failure    8. Coronary artery disease involving native coronary artery of native heart with angina pectoris        Plan:       Ruben was seen today for medication refill.    Diagnoses and all orders for this visit:    Essential hypertension  -     carvedilol (COREG) 3.125 MG tablet; TAKE 1 TABLET(3.125 MG) BY MOUTH TWICE DAILY WITH MEALS  -     losartan (COZAAR) 100 MG tablet; TAKE 1 TABLET(100 MG) BY MOUTH EVERY DAY    Hyperlipidemia, unspecified hyperlipidemia type  -     simvastatin (ZOCOR) 20 MG tablet; TAKE 1 TABLET(20 MG) BY MOUTH EVERY EVENING    Screening for cancer  -     CT Chest Lung Screening Low Dose; Future    Need for immunization against influenza  -     Influenza - High Dose (65+) (PF) (IM)    Screening for AAA (abdominal aortic aneurysm)  -     US Abdominal Aorta; Future    Nicotine dependence, cigarettes, uncomplicated   -     CT Chest Lung Screening Low Dose; Future    Chronic systolic heart failure  The current medical regimen is effective;  continue present plan and medications.    Coronary artery disease involving native coronary artery of native heart with angina pectoris  The current medical regimen is effective;  continue present plan and medications.      Other orders  -     clopidogrel (PLAVIX) 75 mg tablet; Take 1 tablet (75 mg total) by mouth once daily.

## 2020-03-18 ENCOUNTER — NURSE TRIAGE (OUTPATIENT)
Dept: ADMINISTRATIVE | Facility: CLINIC | Age: 65
End: 2020-03-18

## 2020-03-18 NOTE — TELEPHONE ENCOUNTER
"Unable to contact patient for second attempt.  Contacted patient's spouse who stated that patient needs refills for his high blood pressure medication, but spouse is not able to tell C3 nurse which ones and she is not at home during call.  Advised spouse that a message would be sent to PCP about the situation.  No triage needed.  Spouse states that all are well in the home, including 90yr old parent.    Reason for Disposition   [1] Request for URGENT new prescription or refill of "essential" medication (i.e., likelihood of harm to patient if not taken) AND [2] triager unable to fill per unit policy    Protocols used: MEDICATION QUESTION CALL-A-AH      "

## 2020-03-19 DIAGNOSIS — I10 ESSENTIAL HYPERTENSION: ICD-10-CM

## 2020-03-19 DIAGNOSIS — E78.5 HYPERLIPIDEMIA, UNSPECIFIED HYPERLIPIDEMIA TYPE: ICD-10-CM

## 2020-03-19 RX ORDER — LOSARTAN POTASSIUM 100 MG/1
TABLET ORAL
Qty: 90 TABLET | Refills: 0 | Status: SHIPPED | OUTPATIENT
Start: 2020-03-19 | End: 2020-08-07 | Stop reason: SDUPTHER

## 2020-03-19 RX ORDER — SIMVASTATIN 20 MG/1
TABLET, FILM COATED ORAL
Qty: 90 TABLET | Refills: 0 | Status: SHIPPED | OUTPATIENT
Start: 2020-03-19 | End: 2020-06-24

## 2020-03-19 RX ORDER — CARVEDILOL 3.12 MG/1
TABLET ORAL
Qty: 180 TABLET | Refills: 0 | Status: SHIPPED | OUTPATIENT
Start: 2020-03-19 | End: 2020-08-07 | Stop reason: SDUPTHER

## 2020-06-24 DIAGNOSIS — E78.5 HYPERLIPIDEMIA, UNSPECIFIED HYPERLIPIDEMIA TYPE: ICD-10-CM

## 2020-06-24 RX ORDER — SIMVASTATIN 20 MG/1
TABLET, FILM COATED ORAL
Qty: 90 TABLET | Refills: 0 | Status: SHIPPED | OUTPATIENT
Start: 2020-06-24 | End: 2020-08-07 | Stop reason: ALTCHOICE

## 2020-06-24 NOTE — TELEPHONE ENCOUNTER
Rx refilled x 1 due for fasting OV please have pt set up before this refill runs out.  I will order pre-visit labs. If there are any open labs ordered by other specialists, please also link those to lab visit.

## 2020-06-25 NOTE — TELEPHONE ENCOUNTER
Spoke with patient informed of message below. OV and pre-visit labs scheduled. Patient verbalized understanding.

## 2020-08-05 ENCOUNTER — LAB VISIT (OUTPATIENT)
Dept: LAB | Facility: HOSPITAL | Age: 65
End: 2020-08-05
Attending: INTERNAL MEDICINE
Payer: MEDICARE

## 2020-08-05 DIAGNOSIS — E78.00 PURE HYPERCHOLESTEROLEMIA: ICD-10-CM

## 2020-08-05 DIAGNOSIS — I10 ESSENTIAL HYPERTENSION: ICD-10-CM

## 2020-08-05 DIAGNOSIS — R73.09 ABNORMAL GLUCOSE: ICD-10-CM

## 2020-08-05 LAB
ALBUMIN SERPL BCP-MCNC: 3.6 G/DL (ref 3.5–5.2)
ALP SERPL-CCNC: 86 U/L (ref 55–135)
ALT SERPL W/O P-5'-P-CCNC: 16 U/L (ref 10–44)
ANION GAP SERPL CALC-SCNC: 8 MMOL/L (ref 8–16)
AST SERPL-CCNC: 14 U/L (ref 10–40)
BASOPHILS # BLD AUTO: 0.07 K/UL (ref 0–0.2)
BASOPHILS NFR BLD: 0.9 % (ref 0–1.9)
BILIRUB SERPL-MCNC: 0.4 MG/DL (ref 0.1–1)
BUN SERPL-MCNC: 10 MG/DL (ref 8–23)
CALCIUM SERPL-MCNC: 9.4 MG/DL (ref 8.7–10.5)
CHLORIDE SERPL-SCNC: 106 MMOL/L (ref 95–110)
CHOLEST SERPL-MCNC: 171 MG/DL (ref 120–199)
CHOLEST/HDLC SERPL: 4.1 {RATIO} (ref 2–5)
CO2 SERPL-SCNC: 25 MMOL/L (ref 23–29)
CREAT SERPL-MCNC: 0.9 MG/DL (ref 0.5–1.4)
DIFFERENTIAL METHOD: ABNORMAL
EOSINOPHIL # BLD AUTO: 0.3 K/UL (ref 0–0.5)
EOSINOPHIL NFR BLD: 3.8 % (ref 0–8)
ERYTHROCYTE [DISTWIDTH] IN BLOOD BY AUTOMATED COUNT: 14.6 % (ref 11.5–14.5)
EST. GFR  (AFRICAN AMERICAN): >60 ML/MIN/1.73 M^2
EST. GFR  (NON AFRICAN AMERICAN): >60 ML/MIN/1.73 M^2
ESTIMATED AVG GLUCOSE: 123 MG/DL (ref 68–131)
GLUCOSE SERPL-MCNC: 103 MG/DL (ref 70–110)
HBA1C MFR BLD HPLC: 5.9 % (ref 4–5.6)
HCT VFR BLD AUTO: 45.9 % (ref 40–54)
HDLC SERPL-MCNC: 42 MG/DL (ref 40–75)
HDLC SERPL: 24.6 % (ref 20–50)
HGB BLD-MCNC: 14.2 G/DL (ref 14–18)
IMM GRANULOCYTES # BLD AUTO: 0.04 K/UL (ref 0–0.04)
IMM GRANULOCYTES NFR BLD AUTO: 0.5 % (ref 0–0.5)
LDLC SERPL CALC-MCNC: 106 MG/DL (ref 63–159)
LYMPHOCYTES # BLD AUTO: 2.2 K/UL (ref 1–4.8)
LYMPHOCYTES NFR BLD: 29.5 % (ref 18–48)
MCH RBC QN AUTO: 31.2 PG (ref 27–31)
MCHC RBC AUTO-ENTMCNC: 30.9 G/DL (ref 32–36)
MCV RBC AUTO: 101 FL (ref 82–98)
MONOCYTES # BLD AUTO: 0.7 K/UL (ref 0.3–1)
MONOCYTES NFR BLD: 9.3 % (ref 4–15)
NEUTROPHILS # BLD AUTO: 4.1 K/UL (ref 1.8–7.7)
NEUTROPHILS NFR BLD: 56 % (ref 38–73)
NONHDLC SERPL-MCNC: 129 MG/DL
NRBC BLD-RTO: 0 /100 WBC
PLATELET # BLD AUTO: 237 K/UL (ref 150–350)
PMV BLD AUTO: 10.2 FL (ref 9.2–12.9)
POTASSIUM SERPL-SCNC: 4.6 MMOL/L (ref 3.5–5.1)
PROT SERPL-MCNC: 8.1 G/DL (ref 6–8.4)
RBC # BLD AUTO: 4.55 M/UL (ref 4.6–6.2)
SODIUM SERPL-SCNC: 139 MMOL/L (ref 136–145)
TRIGL SERPL-MCNC: 115 MG/DL (ref 30–150)
WBC # BLD AUTO: 7.4 K/UL (ref 3.9–12.7)

## 2020-08-05 PROCEDURE — 85025 COMPLETE CBC W/AUTO DIFF WBC: CPT | Mod: HCNC

## 2020-08-05 PROCEDURE — 80061 LIPID PANEL: CPT | Mod: HCNC

## 2020-08-05 PROCEDURE — 36415 COLL VENOUS BLD VENIPUNCTURE: CPT | Mod: HCNC,PO

## 2020-08-05 PROCEDURE — 83036 HEMOGLOBIN GLYCOSYLATED A1C: CPT | Mod: HCNC

## 2020-08-05 PROCEDURE — 80053 COMPREHEN METABOLIC PANEL: CPT | Mod: HCNC

## 2020-08-07 ENCOUNTER — OFFICE VISIT (OUTPATIENT)
Dept: FAMILY MEDICINE | Facility: CLINIC | Age: 65
End: 2020-08-07
Payer: MEDICARE

## 2020-08-07 VITALS
HEIGHT: 71 IN | HEART RATE: 76 BPM | OXYGEN SATURATION: 98 % | SYSTOLIC BLOOD PRESSURE: 138 MMHG | BODY MASS INDEX: 28.98 KG/M2 | DIASTOLIC BLOOD PRESSURE: 62 MMHG | WEIGHT: 207 LBS | TEMPERATURE: 98 F

## 2020-08-07 DIAGNOSIS — I25.119 CORONARY ARTERY DISEASE INVOLVING NATIVE CORONARY ARTERY OF NATIVE HEART WITH ANGINA PECTORIS: ICD-10-CM

## 2020-08-07 DIAGNOSIS — E78.00 PURE HYPERCHOLESTEROLEMIA: ICD-10-CM

## 2020-08-07 DIAGNOSIS — F17.200 SMOKER: ICD-10-CM

## 2020-08-07 DIAGNOSIS — R06.2 WHEEZING: ICD-10-CM

## 2020-08-07 DIAGNOSIS — R73.09 ABNORMAL GLUCOSE: ICD-10-CM

## 2020-08-07 DIAGNOSIS — Z12.2 ENCOUNTER FOR SCREENING FOR MALIGNANT NEOPLASM OF RESPIRATORY ORGANS: ICD-10-CM

## 2020-08-07 DIAGNOSIS — Z87.891 PERSONAL HISTORY OF NICOTINE DEPENDENCE: ICD-10-CM

## 2020-08-07 DIAGNOSIS — I50.22 CHRONIC SYSTOLIC HEART FAILURE: ICD-10-CM

## 2020-08-07 DIAGNOSIS — Z13.6 SCREENING FOR AAA (ABDOMINAL AORTIC ANEURYSM): ICD-10-CM

## 2020-08-07 DIAGNOSIS — I10 ESSENTIAL HYPERTENSION: Primary | ICD-10-CM

## 2020-08-07 PROCEDURE — 99999 PR PBB SHADOW E&M-EST. PATIENT-LVL IV: ICD-10-PCS | Mod: PBBFAC,HCNC,, | Performed by: INTERNAL MEDICINE

## 2020-08-07 PROCEDURE — 3008F PR BODY MASS INDEX (BMI) DOCUMENTED: ICD-10-PCS | Mod: HCNC,CPTII,S$GLB, | Performed by: INTERNAL MEDICINE

## 2020-08-07 PROCEDURE — 1159F PR MEDICATION LIST DOCUMENTED IN MEDICAL RECORD: ICD-10-PCS | Mod: HCNC,S$GLB,, | Performed by: INTERNAL MEDICINE

## 2020-08-07 PROCEDURE — 3075F PR MOST RECENT SYSTOLIC BLOOD PRESS GE 130-139MM HG: ICD-10-PCS | Mod: HCNC,CPTII,S$GLB, | Performed by: INTERNAL MEDICINE

## 2020-08-07 PROCEDURE — 1159F MED LIST DOCD IN RCRD: CPT | Mod: HCNC,S$GLB,, | Performed by: INTERNAL MEDICINE

## 2020-08-07 PROCEDURE — 3078F PR MOST RECENT DIASTOLIC BLOOD PRESSURE < 80 MM HG: ICD-10-PCS | Mod: HCNC,CPTII,S$GLB, | Performed by: INTERNAL MEDICINE

## 2020-08-07 PROCEDURE — 99214 OFFICE O/P EST MOD 30 MIN: CPT | Mod: HCNC,S$GLB,, | Performed by: INTERNAL MEDICINE

## 2020-08-07 PROCEDURE — 99999 PR PBB SHADOW E&M-EST. PATIENT-LVL IV: CPT | Mod: PBBFAC,HCNC,, | Performed by: INTERNAL MEDICINE

## 2020-08-07 PROCEDURE — 99214 PR OFFICE/OUTPT VISIT, EST, LEVL IV, 30-39 MIN: ICD-10-PCS | Mod: HCNC,S$GLB,, | Performed by: INTERNAL MEDICINE

## 2020-08-07 PROCEDURE — 1101F PT FALLS ASSESS-DOCD LE1/YR: CPT | Mod: HCNC,CPTII,S$GLB, | Performed by: INTERNAL MEDICINE

## 2020-08-07 PROCEDURE — 1126F AMNT PAIN NOTED NONE PRSNT: CPT | Mod: HCNC,S$GLB,, | Performed by: INTERNAL MEDICINE

## 2020-08-07 PROCEDURE — 3008F BODY MASS INDEX DOCD: CPT | Mod: HCNC,CPTII,S$GLB, | Performed by: INTERNAL MEDICINE

## 2020-08-07 PROCEDURE — 1126F PR PAIN SEVERITY QUANTIFIED, NO PAIN PRESENT: ICD-10-PCS | Mod: HCNC,S$GLB,, | Performed by: INTERNAL MEDICINE

## 2020-08-07 PROCEDURE — 3075F SYST BP GE 130 - 139MM HG: CPT | Mod: HCNC,CPTII,S$GLB, | Performed by: INTERNAL MEDICINE

## 2020-08-07 PROCEDURE — 3078F DIAST BP <80 MM HG: CPT | Mod: HCNC,CPTII,S$GLB, | Performed by: INTERNAL MEDICINE

## 2020-08-07 PROCEDURE — 1101F PR PT FALLS ASSESS DOC 0-1 FALLS W/OUT INJ PAST YR: ICD-10-PCS | Mod: HCNC,CPTII,S$GLB, | Performed by: INTERNAL MEDICINE

## 2020-08-07 RX ORDER — CARVEDILOL 3.12 MG/1
TABLET ORAL
Qty: 180 TABLET | Refills: 3 | Status: SHIPPED | OUTPATIENT
Start: 2020-08-07 | End: 2020-12-17

## 2020-08-07 RX ORDER — SIMVASTATIN 20 MG/1
TABLET, FILM COATED ORAL
Qty: 90 TABLET | Refills: 0 | Status: CANCELLED | OUTPATIENT
Start: 2020-08-07

## 2020-08-07 RX ORDER — TIOTROPIUM BROMIDE 18 UG/1
18 CAPSULE ORAL; RESPIRATORY (INHALATION) DAILY
Qty: 30 CAPSULE | Refills: 11 | Status: SHIPPED | OUTPATIENT
Start: 2020-08-07 | End: 2021-08-07

## 2020-08-07 RX ORDER — LOSARTAN POTASSIUM 100 MG/1
TABLET ORAL
Qty: 90 TABLET | Refills: 3 | Status: SHIPPED | OUTPATIENT
Start: 2020-08-07 | End: 2020-12-28

## 2020-08-07 RX ORDER — ALBUTEROL SULFATE 90 UG/1
2 AEROSOL, METERED RESPIRATORY (INHALATION) EVERY 6 HOURS PRN
Qty: 18 G | Refills: 0 | Status: SHIPPED | OUTPATIENT
Start: 2020-08-07 | End: 2020-08-09

## 2020-08-07 RX ORDER — CLOPIDOGREL BISULFATE 75 MG/1
75 TABLET ORAL DAILY
Qty: 90 TABLET | Refills: 3 | Status: CANCELLED | OUTPATIENT
Start: 2020-08-07 | End: 2021-08-07

## 2020-08-07 RX ORDER — ATORVASTATIN CALCIUM 20 MG/1
20 TABLET, FILM COATED ORAL DAILY
Qty: 90 TABLET | Refills: 3 | Status: SHIPPED | OUTPATIENT
Start: 2020-08-07 | End: 2021-10-19

## 2020-08-07 NOTE — PROGRESS NOTES
This note was created by combination of typed  and M-Modal dictation.  Transcription errors may be present.  If there are any questions, please contact me.    Assessment and Plan:   Essential hypertension  -blood pressure borderline.  For now no change, refilled carvedilol, losartan.  If still elevated consider diuretic  -     carvediloL (COREG) 3.125 MG tablet; TAKE 1 TABLET(3.125 MG) BY MOUTH TWICE DAILY WITH MEALS  Dispense: 180 tablet; Refill: 3  -     losartan (COZAAR) 100 MG tablet; TAKE 1 TABLET(100 MG) BY MOUTH EVERY DAY  Dispense: 90 tablet; Refill: 3  -     Comprehensive metabolic panel; Future; Expected date: 02/03/2021    Pure hypercholesterolemia  -lipid profile not quite ideal.  Smoker.  Stop the Zocor.  Changed to Lipitor same dose 20 mg  -     atorvastatin (LIPITOR) 20 MG tablet; Take 1 tablet (20 mg total) by mouth once daily.  Dispense: 90 tablet; Refill: 3  -     Lipid Panel; Future; Expected date: 02/03/2021  -     CBC auto differential; Future; Expected date: 02/07/2021    Coronary artery disease involving native coronary artery of native heart with angina pectoris  -on Plavix and aspirin.  Has been on dual antiplatelet therapy for over a year.  Stop the Plavix stay on aspirin.  On beta-blocker on ARB.  Needs follow-up with Cardiology.  He is leaving town for maybe a month.  I have asked him to schedule follow-up with Cardiology on his return.    Chronic systolic heart failure 6/2018 TTE LVEF 30-35%  -euvolemic today.  Not on diuretic.  On beta-blocker, on ARB.  No change.    Abnormal glucose  -work on therapeutic lifestyle modification.  Future labs ordered.  Drinks 2 beers, not daily he states.  Monitor.  -     Hemoglobin A1C; Future; Expected date: 02/07/2021    Encounter for screening for malignant neoplasm of respiratory organs  Personal history of nicotine dependence   -smoker, check CT lung screening baseline  -     CT Chest Lung Screening Low Dose; Future; Expected date:  08/07/2020    Screening for AAA (abdominal aortic aneurysm)  -     US Abdominal Aorta; Future; Expected date: 08/07/2020    Smoker  Wheezing  -smokes a quarter pack a day.  He had previously been enrolled in smoking cessation and found it helpful until the pandemic period is agreeable for reenrollment.  Does note some episodic wheezing and some dyspnea during the day.  I will try him with Spiriva.  Albuterol rescue inhaler.  -     Ambulatory referral/consult to Smoking Cessation Program; Future; Expected date: 08/14/2020  -     tiotropium (SPIRIVA) 18 mcg inhalation capsule; Inhale 1 capsule (18 mcg total) into the lungs once daily. Controller; take every day  Dispense: 30 capsule; Refill: 11  -     albuterol (PROVENTIL/VENTOLIN HFA) 90 mcg/actuation inhaler; Inhale 2 puffs into the lungs every 6 (six) hours as needed for Wheezing. Whichever brand covered by insurance  Dispense: 18 g; Refill: 0      Medications Discontinued During This Encounter   Medication Reason    losartan (COZAAR) 100 MG tablet     clopidogrel (PLAVIX) 75 mg tablet Therapy completed    simvastatin (ZOCOR) 20 MG tablet Alternate therapy    losartan (COZAAR) 100 MG tablet Reorder    carvediloL (COREG) 3.125 MG tablet Reorder       meds sent this encounter:  Medications Ordered This Encounter   Medications    albuterol (PROVENTIL/VENTOLIN HFA) 90 mcg/actuation inhaler     Sig: Inhale 2 puffs into the lungs every 6 (six) hours as needed for Wheezing. Whichever brand covered by insurance     Dispense:  18 g     Refill:  0    atorvastatin (LIPITOR) 20 MG tablet     Sig: Take 1 tablet (20 mg total) by mouth once daily.     Dispense:  90 tablet     Refill:  3     Stop zocor    carvediloL (COREG) 3.125 MG tablet     Sig: TAKE 1 TABLET(3.125 MG) BY MOUTH TWICE DAILY WITH MEALS     Dispense:  180 tablet     Refill:  3     .    losartan (COZAAR) 100 MG tablet     Sig: TAKE 1 TABLET(100 MG) BY MOUTH EVERY DAY     Dispense:  90 tablet     Refill:  3      .    tiotropium (SPIRIVA) 18 mcg inhalation capsule     Sig: Inhale 1 capsule (18 mcg total) into the lungs once daily. Controller; take every day     Dispense:  30 capsule     Refill:  11       Follow Up: No follow-ups on file. OV 6 months previsit labs ordered. Recall entered.    Subjective:     Chief Complaint   Patient presents with    Annual Exam       HPI  Ruben is a 65 y.o. male, last appointment with this clinic was Visit date not found.    Saw NP 2020 for med refill, ordered CT lung and AAA US  Stopped smoking  2019 cardiology stable f/u 6 months.    previsit labs a1c 5.9  Lipid good    2 brother in laws .  One of complications from colonoscopy and pneumonia  The other with MI.  Known hx of CAD  One of the brother-in-law's owned a large ranch with a lot of cattle in [place].  His  needs help with selling off some of the cattle.  So he has to go to [place] to help with brother in law's ranch. Maybe a month. Leaving on Monday.    Smoking, started again, 3-4 cigarettes daily. Needs to re-enroll in smoking cessation    Reports taking meds as rx'd. And a sinus tablet. sinutab every 2 weeks. PRN.     EtOH, 2 beers on average.  Used to be heavy about 10 years ago. Brother alcohol disorder, .   Advised to stop completely.    Rare soda  Adequate fruit and vegetables    Patient Care Team:  Charles Hwang MD as PCP - General (Internal Medicine)  Shailesh Gregg MA as Care Coordinator    Patient Active Problem List    Diagnosis Date Noted    Coronary artery disease involving native coronary artery with angina pectoris s/p JOSÉ LAD 2018; Occluded RCA with some left to right collaterals. 2018 LHC - LAD mid 60%, D1 small 90% proximal, Cx mild diffuse disease, % proximal - distal vessel fills with some left to right collaterals. Attempted RHC - right % before IVC  IFR guided PCI of the LAD  Proximal 70-80% lesion which was eccentric and hazy.  IFR was equal to 0.86.   Then placed a 3 x 22 mm resolute drug-eluting stent.  We post dilated the proximal portion with a 3.5 x 12 mm noncompliant balloon.  Good result was achieved with NOMI-3 flow through the vessel.       Chronic systolic heart failure 6/2018 TTE LVEF 30-35% 06/04/2018    Nonrheumatic aortic valve stenosis; mild stenosis on TTE 6/2018 06/04/2018    Abnormal glucose 06/08/2017    Former smoker quit 6/2018 02/07/2017    Pure hypercholesterolemia 09/19/2012    Essential hypertension 08/06/2012     Stable and controlled on ARB.          PAST MEDICAL HISTORY:  Past Medical History:   Diagnosis Date    Hyperlipidemia     Hypertension        PAST SURGICAL HISTORY:  Past Surgical History:   Procedure Laterality Date    APPENDECTOMY      CATHETERIZATION OF BOTH LEFT AND RIGHT HEART N/A 6/21/2018    Procedure: Heart Cath-Bilateral;  Surgeon: Kranthi Engel MD;  Location: [facility] CATH LAB;  Service: Cardiology;  Laterality: N/A;  RN PREOP 6/19/2018       SOCIAL HISTORY:  Social History     Socioeconomic History    Marital status:      Spouse name: Not on file    Number of children: Not on file    Years of education: Not on file    Highest education level: Not on file   Occupational History    Occupation: guard at the [employer]   Social Needs    Financial resource strain: Not on file    Food insecurity     Worry: Not on file     Inability: Not on file    Transportation needs     Medical: Not on file     Non-medical: Not on file   Tobacco Use    Smoking status: Former Smoker     Packs/day: 1.00     Years: 50.00     Pack years: 50.00     Types: Cigarettes     Quit date: 2/2/2017     Years since quitting: 3.5    Smokeless tobacco: Current User   Substance and Sexual Activity    Alcohol use: Yes     Alcohol/week: 14.0 standard drinks     Types: 14 Cans of beer per week    Drug use: No    Sexual activity: Yes     Partners: Female   Lifestyle    Physical activity     Days per week: Not on file     " Minutes per session: Not on file    Stress: Not on file   Relationships    Social connections     Talks on phone: Not on file     Gets together: Not on file     Attends Rastafarian service: Not on file     Active member of club or organization: Not on file     Attends meetings of clubs or organizations: Not on file     Relationship status: Not on file   Other Topics Concern    Not on file   Social History Narrative    Not on file       ALLERGIES AND MEDICATIONS: updated and reviewed.  Review of patient's allergies indicates:   Allergen Reactions    Penicillins      Other reaction(s): Hives,itching       Medication List with Changes/Refills   Current Medications    ASPIRIN (ECOTRIN) 81 MG EC TABLET    Take 1 tablet (81 mg total) by mouth once daily.    CARVEDILOL (COREG) 3.125 MG TABLET    TAKE 1 TABLET(3.125 MG) BY MOUTH TWICE DAILY WITH MEALS    CLOPIDOGREL (PLAVIX) 75 MG TABLET    Take 1 tablet (75 mg total) by mouth once daily.    LOSARTAN (COZAAR) 100 MG TABLET    TAKE 1 TABLET(100 MG) BY MOUTH EVERY DAY    SIMVASTATIN (ZOCOR) 20 MG TABLET    TAKE 1 TABLET(20 MG) BY MOUTH EVERY EVENING   Discontinued Medications    LOSARTAN (COZAAR) 100 MG TABLET    TAKE 1 TABLET(100 MG) BY MOUTH EVERY DAY        Review of Systems   All other systems reviewed and are negative.      Objective:   Physical Exam   Vitals:    08/07/20 1506 08/07/20 1529   BP: (!) 158/70 138/62   BP Location: Left arm Left arm   Patient Position: Sitting Sitting   BP Method: Medium (Manual) Medium (Manual)   Pulse: 76    Temp: 97.7 °F (36.5 °C)    TempSrc: Temporal    SpO2: 98%    Weight: 93.9 kg (207 lb)    Height: 5' 11" (1.803 m)     Body mass index is 28.87 kg/m².  Weight: 93.9 kg (207 lb)   Height: 5' 11" (180.3 cm)     Physical Exam  Constitutional:       Appearance: Normal appearance. He is well-developed.   HENT:      Right Ear: Tympanic membrane and external ear normal.      Left Ear: Tympanic membrane and external ear normal.      Nose: " Nose normal.   Eyes:      General: No scleral icterus.     Conjunctiva/sclera: Conjunctivae normal.   Neck:      Thyroid: No thyroid mass or thyromegaly.      Trachea: Trachea normal.   Cardiovascular:      Rate and Rhythm: Normal rate and regular rhythm.      Heart sounds: Normal heart sounds, S1 normal and S2 normal. No murmur.   Pulmonary:      Effort: Pulmonary effort is normal.      Breath sounds: Normal breath sounds.   Abdominal:      General: There is no distension.      Palpations: Abdomen is soft. There is no hepatomegaly, splenomegaly or mass.      Tenderness: There is no abdominal tenderness.   Musculoskeletal:         General: No deformity.   Lymphadenopathy:      Cervical: No cervical adenopathy.   Skin:     General: Skin is warm and dry.      Findings: No rash (on exposed skin).      Comments: On exposed skin   Neurological:      Mental Status: He is alert and oriented to person, place, and time.      Cranial Nerves: No cranial nerve deficit.      Sensory: No sensory deficit.      Deep Tendon Reflexes: Reflexes are normal and symmetric.   Psychiatric:         Speech: Speech normal.         Behavior: Behavior normal.         Thought Content: Thought content normal.         Judgment: Judgment normal.         Component      Latest Ref Rng & Units 8/5/2020   WBC      3.90 - 12.70 K/uL 7.40   RBC      4.60 - 6.20 M/uL 4.55 (L)   Hemoglobin      14.0 - 18.0 g/dL 14.2   Hematocrit      40.0 - 54.0 % 45.9   MCV      82 - 98 fL 101 (H)   MCH      27.0 - 31.0 pg 31.2 (H)   MCHC      32.0 - 36.0 g/dL 30.9 (L)   RDW      11.5 - 14.5 % 14.6 (H)   Platelets      150 - 350 K/uL 237   MPV      9.2 - 12.9 fL 10.2   Immature Granulocytes      0.0 - 0.5 % 0.5   Gran # (ANC)      1.8 - 7.7 K/uL 4.1   Immature Grans (Abs)      0.00 - 0.04 K/uL 0.04   Lymph #      1.0 - 4.8 K/uL 2.2   Mono #      0.3 - 1.0 K/uL 0.7   Eos #      0.0 - 0.5 K/uL 0.3   Baso #      0.00 - 0.20 K/uL 0.07   nRBC      0 /100 WBC 0   Gran%       38.0 - 73.0 % 56.0   Lymph%      18.0 - 48.0 % 29.5   Mono%      4.0 - 15.0 % 9.3   Eosinophil%      0.0 - 8.0 % 3.8   Basophil%      0.0 - 1.9 % 0.9   Differential Method       Automated   Sodium      136 - 145 mmol/L 139   Potassium      3.5 - 5.1 mmol/L 4.6   Chloride      95 - 110 mmol/L 106   CO2      23 - 29 mmol/L 25   Glucose      70 - 110 mg/dL 103   BUN, Bld      8 - 23 mg/dL 10   Creatinine      0.5 - 1.4 mg/dL 0.9   Calcium      8.7 - 10.5 mg/dL 9.4   PROTEIN TOTAL      6.0 - 8.4 g/dL 8.1   Albumin      3.5 - 5.2 g/dL 3.6   BILIRUBIN TOTAL      0.1 - 1.0 mg/dL 0.4   Alkaline Phosphatase      55 - 135 U/L 86   AST      10 - 40 U/L 14   ALT      10 - 44 U/L 16   Anion Gap      8 - 16 mmol/L 8   eGFR if African American      >60 mL/min/1.73 m:2 >60.0   eGFR if non African American      >60 mL/min/1.73 m:2 >60.0   Cholesterol      120 - 199 mg/dL 171   Triglycerides      30 - 150 mg/dL 115   HDL      40 - 75 mg/dL 42   LDL Cholesterol External      63.0 - 159.0 mg/dL 106.0   Hdl/Cholesterol Ratio      20.0 - 50.0 % 24.6   Total Cholesterol/HDL Ratio      2.0 - 5.0 4.1   Non-HDL Cholesterol      mg/dL 129   Hemoglobin A1C External      4.0 - 5.6 % 5.9 (H)   Estimated Avg Glucose      68 - 131 mg/dL 123

## 2020-08-09 RX ORDER — ALBUTEROL SULFATE 90 UG/1
AEROSOL, METERED RESPIRATORY (INHALATION)
Qty: 54 G | Refills: 1 | Status: SHIPPED | OUTPATIENT
Start: 2020-08-09

## 2020-08-18 ENCOUNTER — CLINICAL SUPPORT (OUTPATIENT)
Dept: SMOKING CESSATION | Facility: CLINIC | Age: 65
End: 2020-08-18
Payer: COMMERCIAL

## 2020-08-18 DIAGNOSIS — F17.200 NICOTINE DEPENDENCE: Primary | ICD-10-CM

## 2020-08-18 PROCEDURE — 99999 PR PBB SHADOW E&M-EST. PATIENT-LVL I: CPT | Mod: PBBFAC,,,

## 2020-08-18 PROCEDURE — 99999 PR PBB SHADOW E&M-EST. PATIENT-LVL I: ICD-10-PCS | Mod: PBBFAC,,,

## 2020-08-18 PROCEDURE — 99404 PR PREVENT COUNSEL,INDIV,60 MIN: ICD-10-PCS | Mod: S$GLB,,,

## 2020-08-18 PROCEDURE — 99404 PREV MED CNSL INDIV APPRX 60: CPT | Mod: S$GLB,,,

## 2020-08-18 RX ORDER — IBUPROFEN 200 MG
1 TABLET ORAL DAILY
Qty: 14 PATCH | Refills: 0 | Status: SHIPPED | OUTPATIENT
Start: 2020-08-18 | End: 2020-09-18

## 2020-08-18 RX ORDER — VARENICLINE TARTRATE 0.5 (11)-1
KIT ORAL
Qty: 53 TABLET | Refills: 0 | Status: SHIPPED | OUTPATIENT
Start: 2020-08-18 | End: 2020-09-18

## 2020-08-18 RX ORDER — DM/P-EPHED/ACETAMINOPH/DOXYLAM 30-7.5/3
2 LIQUID (ML) ORAL
Qty: 100 LOZENGE | Refills: 0 | Status: SHIPPED | OUTPATIENT
Start: 2020-08-18 | End: 2020-09-18

## 2020-08-18 NOTE — Clinical Note
Pt presents for intake telephonically, he is currently smoking 1 pk of cigarettes per day, after discussion and assessment recommend the 21mg nicotine patch, the 2mg nicotine lozenge and the chantix starter pk, recommend an abrupt quit, intake note:  discussed strategies to help cut back and to help break the routine and habit associated with smoking, intake handout provided to the patient and discussed, all prescribed NRT discussed in depth as well as tobacco cessation

## 2020-08-25 ENCOUNTER — CLINICAL SUPPORT (OUTPATIENT)
Dept: SMOKING CESSATION | Facility: CLINIC | Age: 65
End: 2020-08-25
Payer: COMMERCIAL

## 2020-08-25 DIAGNOSIS — F17.200 NICOTINE DEPENDENCE: Primary | ICD-10-CM

## 2020-08-25 PROCEDURE — 99999 PR PBB SHADOW E&M-EST. PATIENT-LVL I: ICD-10-PCS | Mod: PBBFAC,,,

## 2020-08-25 PROCEDURE — 99402 PR PREVENT COUNSEL,INDIV,30 MIN: ICD-10-PCS | Mod: S$GLB,,,

## 2020-08-25 PROCEDURE — 99999 PR PBB SHADOW E&M-EST. PATIENT-LVL I: CPT | Mod: PBBFAC,,,

## 2020-08-25 PROCEDURE — 99402 PREV MED CNSL INDIV APPRX 30: CPT | Mod: S$GLB,,,

## 2020-08-25 NOTE — PROGRESS NOTES
Individual Follow-Up Form    8/25/2020    Quit Date: n/a    Clinical Status of Patient: Outpatient    Length of Service: 30 minutes    Continuing Medication: yes  Chantix, Patches or Nicotine Lozenges    Other Medications: n/a     Target Symptoms: Withdrawal and medication side effects. The following were  rated moderate (3) to severe (4) on TCRS:  · Moderate (3): 0  · Severe (4): 0    Comments: pt presents for individual appt telephonically post sccon one week ago, he is just starting the chantix starter pk, wearing the 21mg nicotine patch and using the 2mg nicotine lozenge every hour or so, he is smoking 15 cigarettes per day, down from 1.5pks of cigarettes per day. He is working on cutting back and being more mindful of his smoking, will contact pt in a week for another follow up to check his progress. Will follow     Diagnosis: F17.210    Next Visit: 2 weeks

## 2020-08-25 NOTE — Clinical Note
Comments: pt presents for individual appt telephonically post sccon one week ago, he is just starting the chantix starter pk, wearing the 21mg nicotine patch and using the 2mg nicotine lozenge every hour or so, he is smoking 15 cigarettes per day, down from 1.5pks of cigarettes per day. He is working on cutting back and being more mindful of his smoking, will contact pt in a week for another follow up to check his progress. Will follow

## 2020-09-08 ENCOUNTER — TELEPHONE (OUTPATIENT)
Dept: SMOKING CESSATION | Facility: CLINIC | Age: 65
End: 2020-09-08

## 2020-09-22 ENCOUNTER — HOSPITAL ENCOUNTER (OUTPATIENT)
Dept: RADIOLOGY | Facility: HOSPITAL | Age: 65
Discharge: HOME | End: 2020-09-22
Attending: INTERNAL MEDICINE
Payer: MEDICARE

## 2020-09-22 DIAGNOSIS — Z12.2 ENCOUNTER FOR SCREENING FOR MALIGNANT NEOPLASM OF RESPIRATORY ORGANS: ICD-10-CM

## 2020-09-22 DIAGNOSIS — Z87.891 PERSONAL HISTORY OF NICOTINE DEPENDENCE: ICD-10-CM

## 2020-09-22 DIAGNOSIS — Z13.6 SCREENING FOR AAA (ABDOMINAL AORTIC ANEURYSM): ICD-10-CM

## 2020-09-22 PROBLEM — I77.810 DILATATION OF THORACIC AORTA: Status: ACTIVE | Noted: 2020-09-22

## 2020-09-22 PROCEDURE — G0297 LDCT FOR LUNG CA SCREEN: HCPCS | Mod: TC,HCNC

## 2020-09-22 PROCEDURE — G0297 LDCT FOR LUNG CA SCREEN: HCPCS | Mod: 26,HCNC,, | Performed by: RADIOLOGY

## 2020-09-22 PROCEDURE — 76775 US EXAM ABDO BACK WALL LIM: CPT | Mod: 26,HCNC,, | Performed by: RADIOLOGY

## 2020-09-22 PROCEDURE — 76775 US EXAM ABDO BACK WALL LIM: CPT | Mod: TC,HCNC

## 2020-09-22 PROCEDURE — 76775 US ABDOMINAL AORTA: ICD-10-PCS | Mod: 26,HCNC,, | Performed by: RADIOLOGY

## 2020-09-22 PROCEDURE — G0297 CT CHEST LUNG SCREENING LOW DOSE: ICD-10-PCS | Mod: 26,HCNC,, | Performed by: RADIOLOGY

## 2020-09-22 NOTE — PROGRESS NOTES
Lung-RADS Category:  1 - Negative - Continue annual screening with LDCT in 12 months.   Clinically or potentially clinically significant non lung cancer finding:  S - Significant.   Prior Lung Cancer Modifier:  No history of prior lung cancer.   Borderline aneurysmal dilatation of the ascending thoracic aorta measuring 5.0 cm in diameter.   Extensive coronary artery atherosclerosis.   Nonspecific stranding of the perinephric fat on the left.    Borderline aneurysmal dilatation of the ascending thoracic aorta.  Repeat CT lung screening 1 year  Known history of coronary artery disease.

## 2020-09-22 NOTE — PROGRESS NOTES
AAA screening negative.  Borderline ascending thoracic aorta dilatation 5.0 cm.  That was seen on CT.  Results to patient.  Repeat CT lung screening 1 year.

## 2020-09-25 ENCOUNTER — OFFICE VISIT (OUTPATIENT)
Dept: CARDIOLOGY | Facility: CLINIC | Age: 65
End: 2020-09-25
Payer: MEDICARE

## 2020-09-25 ENCOUNTER — PATIENT OUTREACH (OUTPATIENT)
Dept: ADMINISTRATIVE | Facility: OTHER | Age: 65
End: 2020-09-25

## 2020-09-25 VITALS
BODY MASS INDEX: 28.7 KG/M2 | WEIGHT: 205 LBS | DIASTOLIC BLOOD PRESSURE: 64 MMHG | OXYGEN SATURATION: 95 % | HEIGHT: 71 IN | SYSTOLIC BLOOD PRESSURE: 133 MMHG

## 2020-09-25 DIAGNOSIS — I50.22 CHRONIC SYSTOLIC HEART FAILURE: ICD-10-CM

## 2020-09-25 DIAGNOSIS — E78.00 PURE HYPERCHOLESTEROLEMIA: ICD-10-CM

## 2020-09-25 DIAGNOSIS — I77.810 DILATATION OF THORACIC AORTA: ICD-10-CM

## 2020-09-25 DIAGNOSIS — I35.0 NONRHEUMATIC AORTIC VALVE STENOSIS: ICD-10-CM

## 2020-09-25 DIAGNOSIS — I50.9 CHF (CONGESTIVE HEART FAILURE): ICD-10-CM

## 2020-09-25 DIAGNOSIS — I10 ESSENTIAL HYPERTENSION: Primary | ICD-10-CM

## 2020-09-25 DIAGNOSIS — I25.119 CORONARY ARTERY DISEASE INVOLVING NATIVE CORONARY ARTERY OF NATIVE HEART WITH ANGINA PECTORIS: ICD-10-CM

## 2020-09-25 PROCEDURE — 1101F PT FALLS ASSESS-DOCD LE1/YR: CPT | Mod: HCNC,CPTII,S$GLB, | Performed by: INTERNAL MEDICINE

## 2020-09-25 PROCEDURE — 99214 OFFICE O/P EST MOD 30 MIN: CPT | Mod: HCNC,S$GLB,, | Performed by: INTERNAL MEDICINE

## 2020-09-25 PROCEDURE — 3078F PR MOST RECENT DIASTOLIC BLOOD PRESSURE < 80 MM HG: ICD-10-PCS | Mod: HCNC,CPTII,S$GLB, | Performed by: INTERNAL MEDICINE

## 2020-09-25 PROCEDURE — 99499 UNLISTED E&M SERVICE: CPT | Mod: HCNC,S$GLB,, | Performed by: INTERNAL MEDICINE

## 2020-09-25 PROCEDURE — 3075F SYST BP GE 130 - 139MM HG: CPT | Mod: HCNC,CPTII,S$GLB, | Performed by: INTERNAL MEDICINE

## 2020-09-25 PROCEDURE — 93000 EKG 12-LEAD: ICD-10-PCS | Mod: HCNC,S$GLB,, | Performed by: INTERNAL MEDICINE

## 2020-09-25 PROCEDURE — 3078F DIAST BP <80 MM HG: CPT | Mod: HCNC,CPTII,S$GLB, | Performed by: INTERNAL MEDICINE

## 2020-09-25 PROCEDURE — 1101F PR PT FALLS ASSESS DOC 0-1 FALLS W/OUT INJ PAST YR: ICD-10-PCS | Mod: HCNC,CPTII,S$GLB, | Performed by: INTERNAL MEDICINE

## 2020-09-25 PROCEDURE — 1159F PR MEDICATION LIST DOCUMENTED IN MEDICAL RECORD: ICD-10-PCS | Mod: HCNC,S$GLB,, | Performed by: INTERNAL MEDICINE

## 2020-09-25 PROCEDURE — 99999 PR PBB SHADOW E&M-EST. PATIENT-LVL III: CPT | Mod: PBBFAC,HCNC,, | Performed by: INTERNAL MEDICINE

## 2020-09-25 PROCEDURE — 3008F PR BODY MASS INDEX (BMI) DOCUMENTED: ICD-10-PCS | Mod: HCNC,CPTII,S$GLB, | Performed by: INTERNAL MEDICINE

## 2020-09-25 PROCEDURE — 99499 RISK ADDL DX/OHS AUDIT: ICD-10-PCS | Mod: HCNC,S$GLB,, | Performed by: INTERNAL MEDICINE

## 2020-09-25 PROCEDURE — 3075F PR MOST RECENT SYSTOLIC BLOOD PRESS GE 130-139MM HG: ICD-10-PCS | Mod: HCNC,CPTII,S$GLB, | Performed by: INTERNAL MEDICINE

## 2020-09-25 PROCEDURE — 3008F BODY MASS INDEX DOCD: CPT | Mod: HCNC,CPTII,S$GLB, | Performed by: INTERNAL MEDICINE

## 2020-09-25 PROCEDURE — 99214 PR OFFICE/OUTPT VISIT, EST, LEVL IV, 30-39 MIN: ICD-10-PCS | Mod: HCNC,S$GLB,, | Performed by: INTERNAL MEDICINE

## 2020-09-25 PROCEDURE — 1126F PR PAIN SEVERITY QUANTIFIED, NO PAIN PRESENT: ICD-10-PCS | Mod: HCNC,S$GLB,, | Performed by: INTERNAL MEDICINE

## 2020-09-25 PROCEDURE — 99999 PR PBB SHADOW E&M-EST. PATIENT-LVL III: ICD-10-PCS | Mod: PBBFAC,HCNC,, | Performed by: INTERNAL MEDICINE

## 2020-09-25 PROCEDURE — 1159F MED LIST DOCD IN RCRD: CPT | Mod: HCNC,S$GLB,, | Performed by: INTERNAL MEDICINE

## 2020-09-25 PROCEDURE — 93000 ELECTROCARDIOGRAM COMPLETE: CPT | Mod: HCNC,S$GLB,, | Performed by: INTERNAL MEDICINE

## 2020-09-25 PROCEDURE — 1126F AMNT PAIN NOTED NONE PRSNT: CPT | Mod: HCNC,S$GLB,, | Performed by: INTERNAL MEDICINE

## 2020-09-25 NOTE — PROGRESS NOTES
Subjective:    Patient ID:  Ruben Cardenas is a 65 y.o. male who presents for follow-up of Congestive Heart Failure      HPI     CAD - JOSÉ to LAD 6/21/18 - %, Ischemic CM - EF 35-40%, HTN, HLD, tobacco abuse, 5 cm thoracic aortic aneurysm     Referred by NP  This 63 y.o. presents today for complaint of physical exam and refills.  Patient has hypertension hyperlipidemia and hyperglycemia and a murmur.  Taking meds as directed.  He has managed to cut back on the smoking.  He had quit briefly.  He remains physically active.  Doesn't follow any particular diet.     Echo 10/5/18  · TDS  · Left ventricle ejection fraction is mildly decreased at 45%, mild global hypokinesis  · Left ventricle shows concentric remodeling.  · Grade I (mild) left ventricular diastolic dysfunction consistent with impaired relaxation.  · There is mild-to-moderate aortic valve stenosis.  · RUCHI is 1.50 cm2; peak velocity is 2.17 m/s; mean gradient is 10.55 mmHg.        Echo 5/25/18    1 - Moderately depressed left ventricular systolic function (EF 35-40%).  Global hypokinesis.     2 - Concentric remodeling.     3 - Impaired LV relaxation, normal LAP (grade 1 diastolic dysfunction).     4 - Mild aortic stenosis, RUCHI = 1.63 cm2, AVAi = 0.77 cm2/m2, peak velocity = 2.19 m/s, mean gradient = 12 mmHg.      6/21/18 LHC - LAD mid 60%, D1 small 90% proximal, Cx mild diffuse disease, % proximal - distal vessel fills with some left to right collaterals. Attempted RHC - right % before IVC     Description of Procedure: IFR guided PCI of the LAD     Description of the findings of the procedure: Proximal 70-80% lesion which was eccentric and hazy.  IFR was equal to 0.86.  Then placed a 3 x 22 mm resolute drug-eluting stent.  We post dilated the proximal portion with a 3.5 x 12 mm noncompliant balloon.  Good result was achieved with NOMI-3 flow through the vessel.  Please see Dr. Engel is no for full details of diagnostic portion of the  catheterization.     AAA US 9/22/20  - no AAA    CT chest - outside radiologist - 5 cm thoracic aortic aneurysm      6/13/18 Reports fatigue and MENCHACA for the last year  Gets occasional pain in both shoulders  EKG sinus tachycardia 102 possible old IMI     10/17/18 Denies CP or SOB  EKG NSR - ok  Trying to quit smoking     4/22/19 Denies CP or SOB  Quit smoking  EKG NSR - ok   Cardiac stable  OV 6 months with echo and lexiscan myoview    9/25/20 Denies CP or SOB  5 cm TAAA noted out outside CT    Review of Systems   Constitution: Negative for decreased appetite.   HENT: Negative for ear discharge.    Eyes: Negative for blurred vision.   Endocrine: Negative for polyphagia.   Skin: Negative for nail changes.   Genitourinary: Negative for bladder incontinence.   Neurological: Negative for aphonia.   Psychiatric/Behavioral: Negative for hallucinations.   Allergic/Immunologic: Negative for hives.        Objective:    Physical Exam   Constitutional: He is oriented to person, place, and time. He appears well-developed and well-nourished.   HENT:   Head: Normocephalic and atraumatic.   Eyes: Pupils are equal, round, and reactive to light. Conjunctivae are normal.   Neck: Normal range of motion. Neck supple.   Cardiovascular: Normal rate, normal heart sounds and intact distal pulses.   Pulmonary/Chest: Effort normal and breath sounds normal.   Abdominal: Soft. Bowel sounds are normal.   Musculoskeletal: Normal range of motion.   Neurological: He is alert and oriented to person, place, and time.   Skin: Skin is warm and dry.         Assessment:       1. Essential hypertension    2. Pure hypercholesterolemia    3. Chronic systolic heart failure 6/2018 TTE LVEF 30-35%    4. Nonrheumatic aortic valve stenosis; mild stenosis on TTE 6/2018    5. Coronary artery disease involving native coronary artery of native heart with angina pectoris    6. CHF (congestive heart failure)    7. Dilatation of thoracic aorta on CT 9/2020, borderline          Plan:       Echo and lexiscan myoview - if stable will refer to CT surgery for evaluation of TAAA  Continue Rx for CAD, HTN, HLD

## 2020-10-02 ENCOUNTER — HOSPITAL ENCOUNTER (OUTPATIENT)
Dept: CARDIOLOGY | Facility: HOSPITAL | Age: 65
Discharge: HOME | End: 2020-10-02
Attending: INTERNAL MEDICINE
Payer: MEDICARE

## 2020-10-02 ENCOUNTER — HOSPITAL ENCOUNTER (OUTPATIENT)
Dept: RADIOLOGY | Facility: HOSPITAL | Age: 65
Discharge: HOME | End: 2020-10-02
Attending: INTERNAL MEDICINE
Payer: MEDICARE

## 2020-10-02 VITALS — BODY MASS INDEX: 28.7 KG/M2 | WEIGHT: 205 LBS | HEIGHT: 71 IN

## 2020-10-02 DIAGNOSIS — E78.00 PURE HYPERCHOLESTEROLEMIA: ICD-10-CM

## 2020-10-02 DIAGNOSIS — I35.0 NONRHEUMATIC AORTIC VALVE STENOSIS: ICD-10-CM

## 2020-10-02 DIAGNOSIS — I25.119 CORONARY ARTERY DISEASE INVOLVING NATIVE CORONARY ARTERY OF NATIVE HEART WITH ANGINA PECTORIS: ICD-10-CM

## 2020-10-02 DIAGNOSIS — I50.22 CHRONIC SYSTOLIC HEART FAILURE: ICD-10-CM

## 2020-10-02 DIAGNOSIS — I10 ESSENTIAL HYPERTENSION: ICD-10-CM

## 2020-10-02 LAB
AORTIC ROOT ANNULUS: 3.21 CM
AORTIC VALVE CUSP SEPERATION: 1.56 CM
AV INDEX (PROSTH): 0.38
AV MEAN GRADIENT: 14 MMHG
AV PEAK GRADIENT: 24 MMHG
AV VALVE AREA: 1.47 CM2
AV VELOCITY RATIO: 0.38
BSA FOR ECHO PROCEDURE: 2.16 M2
CV ECHO LV RWT: 0.44 CM
CV STRESS BASE HR: 72 BPM
DIASTOLIC BLOOD PRESSURE: 44 MMHG
DOP CALC AO PEAK VEL: 2.45 M/S
DOP CALC AO VTI: 53.15 CM
DOP CALC LVOT AREA: 3.8 CM2
DOP CALC LVOT DIAMETER: 2.21 CM
DOP CALC LVOT PEAK VEL: 0.94 M/S
DOP CALC LVOT STROKE VOLUME: 78.14 CM3
DOP CALCLVOT PEAK VEL VTI: 20.38 CM
E WAVE DECELERATION TIME: 209.8 MSEC
E/A RATIO: 0.8
E/E' RATIO: 7.78 M/S
ECHO LV POSTERIOR WALL: 1 CM (ref 0.6–1.1)
FRACTIONAL SHORTENING: 26 % (ref 28–44)
INTERVENTRICULAR SEPTUM: 0.99 CM (ref 0.6–1.1)
IVRT: 104.66 MSEC
LA MAJOR: 4.79 CM
LA MINOR: 4.88 CM
LA WIDTH: 3.87 CM
LEFT ATRIUM SIZE: 3.44 CM
LEFT ATRIUM VOLUME INDEX: 25.7 ML/M2
LEFT ATRIUM VOLUME: 54.71 CM3
LEFT INTERNAL DIMENSION IN SYSTOLE: 3.37 CM (ref 2.1–4)
LEFT VENTRICLE DIASTOLIC VOLUME INDEX: 45.1 ML/M2
LEFT VENTRICLE DIASTOLIC VOLUME: 96.11 ML
LEFT VENTRICLE MASS INDEX: 73 G/M2
LEFT VENTRICLE SYSTOLIC VOLUME INDEX: 21.8 ML/M2
LEFT VENTRICLE SYSTOLIC VOLUME: 46.38 ML
LEFT VENTRICULAR INTERNAL DIMENSION IN DIASTOLE: 4.58 CM (ref 3.5–6)
LEFT VENTRICULAR MASS: 156.62 G
LV LATERAL E/E' RATIO: 7 M/S
LV SEPTAL E/E' RATIO: 8.75 M/S
MV PEAK A VEL: 0.87 M/S
MV PEAK E VEL: 0.7 M/S
MV STENOSIS PRESSURE HALF TIME: 60.84 MS
MV VALVE AREA P 1/2 METHOD: 3.62 CM2
NUC STRESS EJECTION FRACTION: 45 %
OHS CV CPX 85 PERCENT MAX PREDICTED HEART RATE MALE: 130
OHS CV CPX MAX PREDICTED HEART RATE: 153
OHS CV CPX PATIENT IS FEMALE: 0
OHS CV CPX PATIENT IS MALE: 1
OHS CV CPX PEAK DIASTOLIC BLOOD PRESSURE: 89 MMHG
OHS CV CPX PEAK HEAR RATE: 85 BPM
OHS CV CPX PEAK RATE PRESSURE PRODUCT: NORMAL
OHS CV CPX PEAK SYSTOLIC BLOOD PRESSURE: 148 MMHG
OHS CV CPX PERCENT MAX PREDICTED HEART RATE ACHIEVED: 56
OHS CV CPX RATE PRESSURE PRODUCT PRESENTING: 7992
PISA TR MAX VEL: 1.8 M/S
PULM VEIN S/D RATIO: 1.15
PV PEAK D VEL: 0.55 M/S
PV PEAK S VEL: 0.63 M/S
PV PEAK VELOCITY: 0.89 CM/S
RA MAJOR: 4.6 CM
RA WIDTH: 2.68 CM
RIGHT VENTRICULAR END-DIASTOLIC DIMENSION: 2.77 CM
SINUS: 3.7 CM
SYSTOLIC BLOOD PRESSURE: 111 MMHG
TDI LATERAL: 0.1 M/S
TDI SEPTAL: 0.08 M/S
TDI: 0.09 M/S
TR MAX PG: 13 MMHG
TRICUSPID ANNULAR PLANE SYSTOLIC EXCURSION: 1.85 CM

## 2020-10-02 PROCEDURE — A9502 TC99M TETROFOSMIN: HCPCS | Mod: HCNC

## 2020-10-02 PROCEDURE — 93018 CV STRESS TEST I&R ONLY: CPT | Mod: HCNC,,, | Performed by: INTERNAL MEDICINE

## 2020-10-02 PROCEDURE — 93306 TTE W/DOPPLER COMPLETE: CPT | Mod: 26,HCNC,, | Performed by: INTERNAL MEDICINE

## 2020-10-02 PROCEDURE — 78452 STRESS TEST WITH MYOCARDIAL PERFUSION (CUPID ONLY): ICD-10-PCS | Mod: 26,HCNC,, | Performed by: INTERNAL MEDICINE

## 2020-10-02 PROCEDURE — 93306 ECHO (CUPID ONLY): ICD-10-PCS | Mod: 26,HCNC,, | Performed by: INTERNAL MEDICINE

## 2020-10-02 PROCEDURE — 93017 CV STRESS TEST TRACING ONLY: CPT | Mod: HCNC

## 2020-10-02 PROCEDURE — 93016 STRESS TEST WITH MYOCARDIAL PERFUSION (CUPID ONLY): ICD-10-PCS | Mod: HCNC,,, | Performed by: INTERNAL MEDICINE

## 2020-10-02 PROCEDURE — 63600175 PHARM REV CODE 636 W HCPCS: Mod: HCNC | Performed by: INTERNAL MEDICINE

## 2020-10-02 PROCEDURE — 93018 STRESS TEST WITH MYOCARDIAL PERFUSION (CUPID ONLY): ICD-10-PCS | Mod: HCNC,,, | Performed by: INTERNAL MEDICINE

## 2020-10-02 PROCEDURE — 78452 HT MUSCLE IMAGE SPECT MULT: CPT | Mod: 26,HCNC,, | Performed by: INTERNAL MEDICINE

## 2020-10-02 PROCEDURE — 93306 TTE W/DOPPLER COMPLETE: CPT | Mod: HCNC

## 2020-10-02 PROCEDURE — 93016 CV STRESS TEST SUPVJ ONLY: CPT | Mod: HCNC,,, | Performed by: INTERNAL MEDICINE

## 2020-10-02 RX ORDER — REGADENOSON 0.08 MG/ML
0.4 INJECTION, SOLUTION INTRAVENOUS ONCE
Status: COMPLETED | OUTPATIENT
Start: 2020-10-02 | End: 2020-10-02

## 2020-10-02 RX ADMIN — REGADENOSON 0.4 MG: 0.08 INJECTION, SOLUTION INTRAVENOUS at 08:10

## 2020-10-12 ENCOUNTER — OFFICE VISIT (OUTPATIENT)
Dept: CARDIOLOGY | Facility: CLINIC | Age: 65
End: 2020-10-12
Payer: MEDICARE

## 2020-10-12 VITALS
DIASTOLIC BLOOD PRESSURE: 64 MMHG | SYSTOLIC BLOOD PRESSURE: 130 MMHG | WEIGHT: 202.38 LBS | OXYGEN SATURATION: 95 % | HEIGHT: 71 IN | BODY MASS INDEX: 28.33 KG/M2 | HEART RATE: 82 BPM

## 2020-10-12 DIAGNOSIS — I35.0 NONRHEUMATIC AORTIC VALVE STENOSIS: ICD-10-CM

## 2020-10-12 DIAGNOSIS — I50.22 CHRONIC SYSTOLIC HEART FAILURE: Primary | ICD-10-CM

## 2020-10-12 DIAGNOSIS — E78.00 PURE HYPERCHOLESTEROLEMIA: ICD-10-CM

## 2020-10-12 DIAGNOSIS — I10 ESSENTIAL HYPERTENSION: ICD-10-CM

## 2020-10-12 DIAGNOSIS — I77.810 DILATATION OF THORACIC AORTA: ICD-10-CM

## 2020-10-12 PROCEDURE — 3078F DIAST BP <80 MM HG: CPT | Mod: HCNC,CPTII,S$GLB, | Performed by: INTERNAL MEDICINE

## 2020-10-12 PROCEDURE — 1159F MED LIST DOCD IN RCRD: CPT | Mod: HCNC,S$GLB,, | Performed by: INTERNAL MEDICINE

## 2020-10-12 PROCEDURE — 1159F PR MEDICATION LIST DOCUMENTED IN MEDICAL RECORD: ICD-10-PCS | Mod: HCNC,S$GLB,, | Performed by: INTERNAL MEDICINE

## 2020-10-12 PROCEDURE — 99999 PR PBB SHADOW E&M-EST. PATIENT-LVL IV: CPT | Mod: PBBFAC,HCNC,, | Performed by: INTERNAL MEDICINE

## 2020-10-12 PROCEDURE — 99214 PR OFFICE/OUTPT VISIT, EST, LEVL IV, 30-39 MIN: ICD-10-PCS | Mod: HCNC,S$GLB,, | Performed by: INTERNAL MEDICINE

## 2020-10-12 PROCEDURE — 1126F AMNT PAIN NOTED NONE PRSNT: CPT | Mod: HCNC,S$GLB,, | Performed by: INTERNAL MEDICINE

## 2020-10-12 PROCEDURE — 3075F PR MOST RECENT SYSTOLIC BLOOD PRESS GE 130-139MM HG: ICD-10-PCS | Mod: HCNC,CPTII,S$GLB, | Performed by: INTERNAL MEDICINE

## 2020-10-12 PROCEDURE — 1126F PR PAIN SEVERITY QUANTIFIED, NO PAIN PRESENT: ICD-10-PCS | Mod: HCNC,S$GLB,, | Performed by: INTERNAL MEDICINE

## 2020-10-12 PROCEDURE — 1101F PR PT FALLS ASSESS DOC 0-1 FALLS W/OUT INJ PAST YR: ICD-10-PCS | Mod: HCNC,CPTII,S$GLB, | Performed by: INTERNAL MEDICINE

## 2020-10-12 PROCEDURE — 3008F BODY MASS INDEX DOCD: CPT | Mod: HCNC,CPTII,S$GLB, | Performed by: INTERNAL MEDICINE

## 2020-10-12 PROCEDURE — 99214 OFFICE O/P EST MOD 30 MIN: CPT | Mod: HCNC,S$GLB,, | Performed by: INTERNAL MEDICINE

## 2020-10-12 PROCEDURE — 3008F PR BODY MASS INDEX (BMI) DOCUMENTED: ICD-10-PCS | Mod: HCNC,CPTII,S$GLB, | Performed by: INTERNAL MEDICINE

## 2020-10-12 PROCEDURE — 1101F PT FALLS ASSESS-DOCD LE1/YR: CPT | Mod: HCNC,CPTII,S$GLB, | Performed by: INTERNAL MEDICINE

## 2020-10-12 PROCEDURE — 3078F PR MOST RECENT DIASTOLIC BLOOD PRESSURE < 80 MM HG: ICD-10-PCS | Mod: HCNC,CPTII,S$GLB, | Performed by: INTERNAL MEDICINE

## 2020-10-12 PROCEDURE — 99999 PR PBB SHADOW E&M-EST. PATIENT-LVL IV: ICD-10-PCS | Mod: PBBFAC,HCNC,, | Performed by: INTERNAL MEDICINE

## 2020-10-12 PROCEDURE — 3075F SYST BP GE 130 - 139MM HG: CPT | Mod: HCNC,CPTII,S$GLB, | Performed by: INTERNAL MEDICINE

## 2020-10-12 NOTE — PROGRESS NOTES
Subjective:    Patient ID:  Ruben Cardenas is a 65 y.o. male who presents for follow-up of Results      HPI     CAD - JOSÉ to LAD 6/21/18 - %, Ischemic CM - EF 35-40%, HTN, HLD, tobacco abuse, 5 cm thoracic aortic aneurysm     Referred by NP  This 63 y.o. presents today for complaint of physical exam and refills.  Patient has hypertension hyperlipidemia and hyperglycemia and a murmur.  Taking meds as directed.  He has managed to cut back on the smoking.  He had quit briefly.  He remains physically active.  Doesn't follow any particular diet.    Stress test 10/2/20    The study shows abnormal myocardial perfusion.    Abnormal myocardial perfusion study.    Perfusion Defect There is a mild intensity, fixed defect consistent with scar  in the inferior wall(s).    Gated perfusion images showed an ejection fraction of 45% post stress.    The EKG portion of this study is negative for ischemia.    The patient reported no chest pain during the stress test.    There were no arrhythmias during stress.    Echo 10/2/20  · With normal systolic function. The estimated ejection fraction is 55%.  · Normal left ventricular diastolic function.  · Normal right ventricular systolic function.  · Mild aortic valve stenosis.  · Aortic valve area is 1.47 cm2; peak velocity is 2.45 m/s; mean gradient is 14 mmHg.     Echo 10/5/18  · TDS  · Left ventricle ejection fraction is mildly decreased at 45%, mild global hypokinesis  · Left ventricle shows concentric remodeling.  · Grade I (mild) left ventricular diastolic dysfunction consistent with impaired relaxation.  · There is mild-to-moderate aortic valve stenosis.  · RUCHI is 1.50 cm2; peak velocity is 2.17 m/s; mean gradient is 10.55 mmHg.        Echo 5/25/18    1 - Moderately depressed left ventricular systolic function (EF 35-40%).  Global hypokinesis.     2 - Concentric remodeling.     3 - Impaired LV relaxation, normal LAP (grade 1 diastolic dysfunction).     4 - Mild aortic  stenosis, RUCHI = 1.63 cm2, AVAi = 0.77 cm2/m2, peak velocity = 2.19 m/s, mean gradient = 12 mmHg.      6/21/18 LHC - LAD mid 60%, D1 small 90% proximal, Cx mild diffuse disease, % proximal - distal vessel fills with some left to right collaterals. Attempted RHC - right % before IVC     Description of Procedure: IFR guided PCI of the LAD     Description of the findings of the procedure: Proximal 70-80% lesion which was eccentric and hazy.  IFR was equal to 0.86.  Then placed a 3 x 22 mm resolute drug-eluting stent.  We post dilated the proximal portion with a 3.5 x 12 mm noncompliant balloon.  Good result was achieved with NOMI-3 flow through the vessel.  Please see Dr. Engel is no for full details of diagnostic portion of the catheterization.     AAA US 9/22/20  - no AAA     CT chest - outside radiologist - 5 cm thoracic aortic aneurysm        6/13/18 Reports fatigue and MENCHACA for the last year  Gets occasional pain in both shoulders  EKG sinus tachycardia 102 possible old IMI     10/17/18 Denies CP or SOB  EKG NSR - ok  Trying to quit smoking     4/22/19 Denies CP or SOB  Quit smoking  EKG NSR - ok   Cardiac stable  OV 6 months with echo and lexiscan myoview     9/25/20 Denies CP or SOB  5 cm TAAA noted out outside CT   Echo and lexiscan myoview - if stable will refer to CT surgery for evaluation of TAAA  Continue Rx for CAD, HTN, HLD    10/12/20 Denies CP or SOB. Wants to go camping    Review of Systems   Constitution: Negative for decreased appetite.   HENT: Negative for ear discharge.    Eyes: Negative for blurred vision.   Endocrine: Negative for polyphagia.   Skin: Negative for nail changes.   Genitourinary: Negative for bladder incontinence.   Neurological: Negative for aphonia.   Psychiatric/Behavioral: Negative for hallucinations.   Allergic/Immunologic: Negative for hives.        Objective:    Physical Exam   Constitutional: He is oriented to person, place, and time. He appears well-developed  and well-nourished.   HENT:   Head: Normocephalic and atraumatic.   Eyes: Pupils are equal, round, and reactive to light. Conjunctivae are normal.   Neck: Normal range of motion. Neck supple.   Cardiovascular: Normal rate, normal heart sounds and intact distal pulses.   Pulmonary/Chest: Effort normal and breath sounds normal.   Abdominal: Soft. Bowel sounds are normal.   Musculoskeletal: Normal range of motion.   Neurological: He is alert and oriented to person, place, and time.   Skin: Skin is warm and dry.         Assessment:       1. Chronic systolic heart failure 6/2018 TTE LVEF 30-35%    2. Nonrheumatic aortic valve stenosis; mild stenosis on TTE 6/2018    3. Essential hypertension    4. Pure hypercholesterolemia    5. Dilatation of thoracic aorta on CT 9/2020, borderline         Plan:       Echo and stress test with stable findings  Will refer to CT surgery for thoracic aortic aneurysm  Continue Rx for CAD.HTN,HLD  OV here 6 months

## 2020-11-02 DIAGNOSIS — I25.10 ATHEROSCLEROSIS OF NATIVE CORONARY ARTERY OF NATIVE HEART WITHOUT ANGINA PECTORIS: ICD-10-CM

## 2020-11-02 DIAGNOSIS — I20.89 OTHER FORMS OF ANGINA PECTORIS: ICD-10-CM

## 2020-11-03 ENCOUNTER — PATIENT OUTREACH (OUTPATIENT)
Dept: ADMINISTRATIVE | Facility: OTHER | Age: 65
End: 2020-11-03

## 2020-11-03 NOTE — PROGRESS NOTES
Requested updates within Care Everywhere.  Patient's chart was reviewed for overdue CHRIS topics.  Immunizations reconciled.

## 2020-11-04 ENCOUNTER — HOSPITAL ENCOUNTER (OUTPATIENT)
Dept: RADIOLOGY | Facility: HOSPITAL | Age: 65
Discharge: HOME | End: 2020-11-04
Attending: THORACIC SURGERY (CARDIOTHORACIC VASCULAR SURGERY)
Payer: MEDICARE

## 2020-11-04 ENCOUNTER — HOSPITAL ENCOUNTER (OUTPATIENT)
Dept: VASCULAR SURGERY | Facility: CLINIC | Age: 65
Discharge: HOME | End: 2020-11-04
Attending: THORACIC SURGERY (CARDIOTHORACIC VASCULAR SURGERY)
Payer: MEDICARE

## 2020-11-04 ENCOUNTER — OFFICE VISIT (OUTPATIENT)
Dept: CARDIOTHORACIC SURGERY | Facility: CLINIC | Age: 65
End: 2020-11-04
Payer: MEDICARE

## 2020-11-04 VITALS
HEIGHT: 71 IN | SYSTOLIC BLOOD PRESSURE: 130 MMHG | DIASTOLIC BLOOD PRESSURE: 74 MMHG | HEART RATE: 75 BPM | TEMPERATURE: 86 F | OXYGEN SATURATION: 97 % | WEIGHT: 200 LBS | BODY MASS INDEX: 28 KG/M2

## 2020-11-04 DIAGNOSIS — I25.10 ATHEROSCLEROSIS OF NATIVE CORONARY ARTERY OF NATIVE HEART WITHOUT ANGINA PECTORIS: ICD-10-CM

## 2020-11-04 DIAGNOSIS — I20.0 UNSTABLE ANGINA PECTORIS: ICD-10-CM

## 2020-11-04 DIAGNOSIS — I20.89 OTHER FORMS OF ANGINA PECTORIS: ICD-10-CM

## 2020-11-04 DIAGNOSIS — Z01.818 PRE-OP EXAM: ICD-10-CM

## 2020-11-04 DIAGNOSIS — I77.810 DILATATION OF THORACIC AORTA: Primary | ICD-10-CM

## 2020-11-04 PROCEDURE — 3078F PR MOST RECENT DIASTOLIC BLOOD PRESSURE < 80 MM HG: ICD-10-PCS | Mod: HCNC,CPTII,S$GLB, | Performed by: THORACIC SURGERY (CARDIOTHORACIC VASCULAR SURGERY)

## 2020-11-04 PROCEDURE — 99999 PR PBB SHADOW E&M-EST. PATIENT-LVL III: CPT | Mod: PBBFAC,HCNC,, | Performed by: THORACIC SURGERY (CARDIOTHORACIC VASCULAR SURGERY)

## 2020-11-04 PROCEDURE — 99499 RISK ADDL DX/OHS AUDIT: ICD-10-PCS | Mod: S$GLB,,, | Performed by: THORACIC SURGERY (CARDIOTHORACIC VASCULAR SURGERY)

## 2020-11-04 PROCEDURE — 99205 OFFICE O/P NEW HI 60 MIN: CPT | Mod: HCNC,S$GLB,, | Performed by: THORACIC SURGERY (CARDIOTHORACIC VASCULAR SURGERY)

## 2020-11-04 PROCEDURE — 3075F PR MOST RECENT SYSTOLIC BLOOD PRESS GE 130-139MM HG: ICD-10-PCS | Mod: HCNC,CPTII,S$GLB, | Performed by: THORACIC SURGERY (CARDIOTHORACIC VASCULAR SURGERY)

## 2020-11-04 PROCEDURE — 99205 PR OFFICE/OUTPT VISIT, NEW, LEVL V, 60-74 MIN: ICD-10-PCS | Mod: HCNC,S$GLB,, | Performed by: THORACIC SURGERY (CARDIOTHORACIC VASCULAR SURGERY)

## 2020-11-04 PROCEDURE — 3008F BODY MASS INDEX DOCD: CPT | Mod: HCNC,CPTII,S$GLB, | Performed by: THORACIC SURGERY (CARDIOTHORACIC VASCULAR SURGERY)

## 2020-11-04 PROCEDURE — 1126F AMNT PAIN NOTED NONE PRSNT: CPT | Mod: HCNC,S$GLB,, | Performed by: THORACIC SURGERY (CARDIOTHORACIC VASCULAR SURGERY)

## 2020-11-04 PROCEDURE — 71275 CT ANGIOGRAPHY CHEST: CPT | Mod: 26,HCNC,, | Performed by: RADIOLOGY

## 2020-11-04 PROCEDURE — 1101F PR PT FALLS ASSESS DOC 0-1 FALLS W/OUT INJ PAST YR: ICD-10-PCS | Mod: HCNC,CPTII,S$GLB, | Performed by: THORACIC SURGERY (CARDIOTHORACIC VASCULAR SURGERY)

## 2020-11-04 PROCEDURE — 1101F PT FALLS ASSESS-DOCD LE1/YR: CPT | Mod: HCNC,CPTII,S$GLB, | Performed by: THORACIC SURGERY (CARDIOTHORACIC VASCULAR SURGERY)

## 2020-11-04 PROCEDURE — 25500020 PHARM REV CODE 255: Mod: HCNC | Performed by: THORACIC SURGERY (CARDIOTHORACIC VASCULAR SURGERY)

## 2020-11-04 PROCEDURE — 1159F PR MEDICATION LIST DOCUMENTED IN MEDICAL RECORD: ICD-10-PCS | Mod: HCNC,S$GLB,, | Performed by: THORACIC SURGERY (CARDIOTHORACIC VASCULAR SURGERY)

## 2020-11-04 PROCEDURE — 71275 CTA CHEST NON CORONARY: ICD-10-PCS | Mod: 26,HCNC,, | Performed by: RADIOLOGY

## 2020-11-04 PROCEDURE — 1126F PR PAIN SEVERITY QUANTIFIED, NO PAIN PRESENT: ICD-10-PCS | Mod: HCNC,S$GLB,, | Performed by: THORACIC SURGERY (CARDIOTHORACIC VASCULAR SURGERY)

## 2020-11-04 PROCEDURE — 99999 PR PBB SHADOW E&M-EST. PATIENT-LVL III: ICD-10-PCS | Mod: PBBFAC,HCNC,, | Performed by: THORACIC SURGERY (CARDIOTHORACIC VASCULAR SURGERY)

## 2020-11-04 PROCEDURE — 3008F PR BODY MASS INDEX (BMI) DOCUMENTED: ICD-10-PCS | Mod: HCNC,CPTII,S$GLB, | Performed by: THORACIC SURGERY (CARDIOTHORACIC VASCULAR SURGERY)

## 2020-11-04 PROCEDURE — 93880 EXTRACRANIAL BILAT STUDY: CPT | Mod: HCNC,S$GLB,, | Performed by: SURGERY

## 2020-11-04 PROCEDURE — 93880 PR DUPLEX SCAN EXTRACRANIAL,BILAT: ICD-10-PCS | Mod: HCNC,S$GLB,, | Performed by: SURGERY

## 2020-11-04 PROCEDURE — 1159F MED LIST DOCD IN RCRD: CPT | Mod: HCNC,S$GLB,, | Performed by: THORACIC SURGERY (CARDIOTHORACIC VASCULAR SURGERY)

## 2020-11-04 PROCEDURE — 3078F DIAST BP <80 MM HG: CPT | Mod: HCNC,CPTII,S$GLB, | Performed by: THORACIC SURGERY (CARDIOTHORACIC VASCULAR SURGERY)

## 2020-11-04 PROCEDURE — 3075F SYST BP GE 130 - 139MM HG: CPT | Mod: HCNC,CPTII,S$GLB, | Performed by: THORACIC SURGERY (CARDIOTHORACIC VASCULAR SURGERY)

## 2020-11-04 PROCEDURE — 71275 CT ANGIOGRAPHY CHEST: CPT | Mod: TC,HCNC

## 2020-11-04 PROCEDURE — 99499 UNLISTED E&M SERVICE: CPT | Mod: S$GLB,,, | Performed by: THORACIC SURGERY (CARDIOTHORACIC VASCULAR SURGERY)

## 2020-11-04 RX ADMIN — IOHEXOL 100 ML: 350 INJECTION, SOLUTION INTRAVENOUS at 10:11

## 2020-11-04 NOTE — Clinical Note
November 4, 2020      Kranthi Engel MD  [address]  [place] [address]           [facility] - Cardiovasc Surg 2nd Fl  [address]  [facility] [address]  Phone: [phone]          Patient: Ruben Cardenas   MR Number: [id number]   YOB: 1955   Date of Visit: 11/4/2020       Dear Dr. Kranthi Engel:    Thank you for referring Ruben Cardenas to me for evaluation. Attached you will find relevant portions of my assessment and plan of care.    If you have questions, please do not hesitate to call me. I look forward to following Ruben Cardenas along with you.    Sincerely,    Federico Ramirez MD    Enclosure  CC:  No Recipients    If you would like to receive this communication electronically, please contact [email or web address] or [phone] to request more information on [facility] Link access.    For providers and/or their staff who would like to refer a patient to [facility], please contact us through our one-stop-shop provider referral line, [facility], at [phone].    If you feel you have received this communication in error or would no longer like to receive these types of communications, please e-mail [email or web address]

## 2020-11-04 NOTE — PROGRESS NOTES
Subjective:      Patient ID: Ruben Cardenas is a 65 y.o. male.    Chief Complaint: No chief complaint on file.      HPI:  Ruben Cardenas is a 65 y.o. male with a medical history significant for hyperlipidemia, hypertension, and tobacco abuse (current everyday smoker, 1-2 ppd smoker for 55 years) who presents for consult for TAA.  Patient reports that he saw his primary care doctor ordered a ECHO which revealed dilated aneurysm.  He denies symptoms but does reports that he remains an everyday smoker and has no desire to quit smoking.   He reports that his blood pressure is well controlled on current regimen.  He denies chest pains.     Family and social history reviewed    Review of patient's allergies indicates:   Allergen Reactions    Penicillins      Other reaction(s): Hives,itching     Past Medical History:   Diagnosis Date    Hyperlipidemia     Hypertension      Past Surgical History:   Procedure Laterality Date    APPENDECTOMY      CATHETERIZATION OF BOTH LEFT AND RIGHT HEART N/A 6/21/2018    Procedure: Heart Cath-Bilateral;  Surgeon: Kranthi Engel MD;  Location: [facility] CATH LAB;  Service: Cardiology;  Laterality: N/A;  RN PREOP 6/19/2018     Family History     Problem Relation (Age of Onset)    Alcohol abuse Brother    COPD Sister    Heart disease Mother    Hyperlipidemia Mother    Hypertension Sister    No Known Problems Sister, Son, Daughter    Rashes / Skin problems Mother        Social History     Socioeconomic History    Marital status:      Spouse name: Not on file    Number of children: Not on file    Years of education: Not on file    Highest education level: Not on file   Occupational History    Occupation: guard at the [employer]   Social Needs    Financial resource strain: Not on file    Food insecurity     Worry: Not on file     Inability: Not on file    Transportation needs     Medical: Not on file     Non-medical: Not on file   Tobacco Use    Smoking status:  Former Smoker     Packs/day: 1.00     Years: 50.00     Pack years: 50.00     Types: Cigarettes     Quit date: 2/2/2017     Years since quitting: 3.7    Smokeless tobacco: Current User   Substance and Sexual Activity    Alcohol use: Yes     Alcohol/week: 14.0 standard drinks     Types: 14 Cans of beer per week    Drug use: No    Sexual activity: Yes     Partners: Female   Lifestyle    Physical activity     Days per week: Not on file     Minutes per session: Not on file    Stress: Not on file   Relationships    Social connections     Talks on phone: Not on file     Gets together: Not on file     Attends Buddhist service: Not on file     Active member of club or organization: Not on file     Attends meetings of clubs or organizations: Not on file     Relationship status: Not on file   Other Topics Concern    Not on file   Social History Narrative    Not on file       Current Outpatient Medications:     albuterol (PROVENTIL/VENTOLIN HFA) 90 mcg/actuation inhaler, INHALE 2 PUFFS INTO THE LUNGS EVERY 6 HOURS AS NEEDED FOR WHEEZING, Disp: 54 g, Rfl: 1    atorvastatin (LIPITOR) 20 MG tablet, Take 1 tablet (20 mg total) by mouth once daily., Disp: 90 tablet, Rfl: 3    carvediloL (COREG) 3.125 MG tablet, TAKE 1 TABLET(3.125 MG) BY MOUTH TWICE DAILY WITH MEALS, Disp: 180 tablet, Rfl: 3    losartan (COZAAR) 100 MG tablet, TAKE 1 TABLET(100 MG) BY MOUTH EVERY DAY, Disp: 90 tablet, Rfl: 3    tiotropium (SPIRIVA) 18 mcg inhalation capsule, Inhale 1 capsule (18 mcg total) into the lungs once daily. Controller; take every day, Disp: 30 capsule, Rfl: 11    aspirin (ECOTRIN) 81 MG EC tablet, Take 1 tablet (81 mg total) by mouth once daily., Disp: , Rfl: 0  No current facility-administered medications for this visit.   Current medications Reviewed    Review of Systems   Constitutional: Positive for fatigue. Negative for activity change, appetite change and fever.   HENT: Negative for nosebleeds.    Respiratory: Negative  for cough and shortness of breath.         MENCHACA   Cardiovascular: Negative for chest pain, palpitations and leg swelling.   Gastrointestinal: Negative for abdominal distention, abdominal pain and nausea.   Genitourinary: Negative for frequency.   Musculoskeletal: Negative for arthralgias and myalgias.   Skin: Negative for rash.   Neurological: Negative for dizziness and numbness.   Hematological: Does not bruise/bleed easily.     Objective:   Physical Exam  Constitutional:       Appearance: He is well-developed.   HENT:      Head: Normocephalic and atraumatic.   Eyes:      Extraocular Movements: Extraocular movements intact.   Neck:      Musculoskeletal: Normal range of motion.   Cardiovascular:      Rate and Rhythm: Normal rate and regular rhythm.      Heart sounds: Normal heart sounds.   Pulmonary:      Effort: Pulmonary effort is normal.      Breath sounds: Normal breath sounds.   Abdominal:      Palpations: Abdomen is soft.   Musculoskeletal: Normal range of motion.   Skin:     General: Skin is warm and dry.      Capillary Refill: Capillary refill takes less than 2 seconds.   Neurological:      Mental Status: He is alert and oriented to person, place, and time.   Psychiatric:         Mood and Affect: Mood normal.         Behavior: Behavior normal.         Diagnostic Results:   ECHO 10/2/2020   · With normal systolic function. The estimated ejection fraction is 55%.  · Normal left ventricular diastolic function.  · Normal right ventricular systolic function.  · Mild aortic valve stenosis.  · Aortic valve area is 1.47 cm2; peak velocity is 2.45 m/s; mean gradient is 14 mmHg.  · Sinus 3.7  CTA Chest 11/4/2020  Aorta:  -Left-sided aortic arch.  -There are 3 arch branches with calcification in the proximal portions of those arch branch vessels including at their origins.  -There is three-vessel coronary artery calcification.  -There is calcification in aortic valve leaflets and aortic valve annulus.  Mid and distal  ascending aorta has no calcification in its anterior or lateral walls.  -There is mural thickening along the medial, lateral and posterior walls of the proximal, mid and distal ascending aorta as well as the circumferential wall of the aortic arch and the origins of arch branch vessels. Radiographic appearance is compatible with soft plaque versus mural thrombus (see axial series 2 images 233 through 125).  Although I detect no ulceration, there is a saccular aneurysm at the underside of the distal ascending aorta and proximal aortic arch discussed further below.  There is also evidence of mural thrombus in the posterior aspect of the distal descending aorta, discussed below.  -There is calcification at the medial and lateral walls of the aortic arch including the underside of the arch in this patient with a rather posterior origin of the left subclavian artery, and calcification in the descending thoracic aorta.  Measurements and description of the aortic lumen:  Prox AAo = 2.8 cm.  (cor  image #189).  Mid AAo at the level of the RPA = 3.6 x 3.5 cm.  (cor  image #183); sagittal series 602, image 269.  There is a shallow saccular aneurysm arising from the posterior wall of the distal ascending aorta extending along the underside of the arch.  Luminal measurement includes that aneurysm:  Distal AAo= 3.7 by 4.1 cm.  (sag  image # 269; coronal series 601, image 205).  Anterior aortic arch below the origins of the right innominate and left common carotid arteries  = 3.6 cm.  (sag  image # 276).  Mid aortic arch between LCC and LSA origins = 2.7 cm.  (sag  image # 278).  Isthmus = 2.4 cm.  (sag  image # 274).  Prox D'g Ao = 2.5 cm.  (sag  image # 269).  Mid D'g Ao at the level of the left atrium = 2.7 cm.  (sag  image # 269).  Distal D'g Ao  Shallow ulcers are present in the wall of the distal descending aorta, well seen along the posterior wall on sagittal series 602,  image 275 and along the anterior wall on axial series 2, image 432.  Measurement of the distal descending aortic caliber = 2.3 cm.  (sag  image # 275).  Irregularity of the posterior aspect of the lumen of the distal descending aorta suggest mural thrombus (axial series 2, image 457).     Assessment:   1. TAA  Plan:   See Dr. Ramirez's plan below    I have seen the patient and reviewed the nurse practitioner's note above. I have personally interviewed and examined the patient at bedside and agree with the findings.     Mr. Cardenas is a 65 year-old male smoker (1ppd x 55 years) with a history of coronary artery disease s/p stent to LAD (2018) and known totally occluded RCA with inferior wall scar, heart failure reduced ejection fraction due to ischemic cardiomyopathy, and hypertension, who was noted to have an ascending aortic aneurysm on CT scan.  He denies any cardiac symptoms, stating he has no chest pain, no new back pain, and no dyspnea on exertion.  His most recent echocardiogram showed 55% ejection fraction with mild aortic stenosis and 3.7cm Sinus of Valsalva.  His most recent angiogram was in 2018.    CT chest noncontrast shows a 4.5cm ascending aorta at the level of the right pulmonary artery which increases to 5.0cm at the superior portion of the ascending aorta and proximal aortic arch (origin of the innominate artery) then decreases to 3.0cm at the distal arch, as well as severe aortic arch calcifications and mild ascending aortic calcifications.    CTA chest shows a 3.6cm ascending aorta (with very thickened walls) at the level of the right pulmonary artery which increases to a 4.1cm saccular aneurysm at the superior portion of the ascending aorta and proximal aortic arch then decreases to 3.0cm at the distal arch.    Carotid ultrasound shows nonsignificant disease.    We had an extensive discussion with him regarding the ACC/AHA guidelines and we agreed that we should continue to monitor his aortic  aneurysm with yearly CT scans.  Additionally, we had a lengthy talk about smoking cessation.  He will see me again in one year, but if he develops symptoms he will see me immediately.      Federico Ramirez MD  Cardiothoracic Surgery  [facility]

## 2020-11-04 NOTE — LETTER
November 4, 2020        Kranthi Engel MD  [address]  [place] [address]             [facility] - Cardiovasc Surg 2nd Fl  [address]  [place] [address]  Phone: [phone]   Patient: Ruben Cardenas   MR Number: [id number]   YOB: 1955   Date of Visit: 11/4/2020     Dear Dr. Engel,     It has been a privilege for us to see your patient, Mr. Cardenas, at our [facility].  We had a chance to meet with him and went over the history, echocardiographic, as well as angiographic findings in detail.  As you know, he is a 65 year-old male smoker (1ppd x 55 years) with a history of coronary artery disease s/p stent to LAD (2018) and known totally occluded RCA with inferior wall scar, heart failure reduced ejection fraction due to ischemic cardiomyopathy, and hypertension, who was noted to have an ascending aortic aneurysm on CT scan.  He denies any cardiac symptoms, stating he has no chest pain, no new back pain, and no dyspnea on exertion.  His most recent echocardiogram showed 55% ejection fraction with mild aortic stenosis and 3.7cm Sinus of Valsalva.  His most recent angiogram was in 2018.    CT chest noncontrast shows a 4.5cm ascending aorta at the level of the right pulmonary artery which increases to 5.0cm at the superior portion of the ascending aorta and proximal aortic arch (origin of the innominate artery) then decreases to 3.0cm at the distal arch, as well as severe aortic arch calcifications and mild ascending aortic calcifications.    CTA chest shows a 3.6cm ascending aorta (with very thickened walls) at the level of the right pulmonary artery which increases to a 4.1cm saccular aneurysm at the superior portion of the ascending aorta and proximal aortic arch then decreases to 3.0cm at the distal arch.    Carotid ultrasound shows nonsignificant disease.    We had an extensive discussion with him regarding the ACC/AHA guidelines and we agreed that we should  continue to monitor his aortic aneurysm with yearly CT scans.  Additionally, we had a lengthy talk about smoking cessation.  He will see me again in one year, but if he develops symptoms he will see me immediately.    Thank you for referring Mr. Cardenas and for your trust and confidence in our [facility].    Sincerely,     Federico Ramirez MD  Cardiothoracic Surgery  [facility]

## 2020-11-06 LAB
CREAT SERPL-MCNC: 0.9 MG/DL (ref 0.5–1.4)
SAMPLE: NORMAL

## 2021-03-10 ENCOUNTER — IMMUNIZATION (OUTPATIENT)
Dept: OBSTETRICS AND GYNECOLOGY | Facility: CLINIC | Age: 66
End: 2021-03-10
Payer: MEDICARE

## 2021-03-10 DIAGNOSIS — Z23 NEED FOR VACCINATION: Primary | ICD-10-CM

## 2021-03-10 PROCEDURE — 91300 COVID-19, MRNA, LNP-S, PF, 30 MCG/0.3 ML DOSE VACCINE: CPT | Mod: PBBFAC | Performed by: FAMILY MEDICINE

## 2021-03-31 ENCOUNTER — IMMUNIZATION (OUTPATIENT)
Dept: OBSTETRICS AND GYNECOLOGY | Facility: CLINIC | Age: 66
End: 2021-03-31
Payer: MEDICARE

## 2021-03-31 DIAGNOSIS — I10 ESSENTIAL HYPERTENSION: Primary | ICD-10-CM

## 2021-03-31 DIAGNOSIS — Z23 NEED FOR VACCINATION: Primary | ICD-10-CM

## 2021-03-31 PROCEDURE — 91300 COVID-19, MRNA, LNP-S, PF, 30 MCG/0.3 ML DOSE VACCINE: CPT | Mod: PBBFAC | Performed by: FAMILY MEDICINE

## 2021-03-31 PROCEDURE — 0002A COVID-19, MRNA, LNP-S, PF, 30 MCG/0.3 ML DOSE VACCINE: CPT | Mod: PBBFAC | Performed by: FAMILY MEDICINE

## 2021-03-31 RX ORDER — CARVEDILOL 3.12 MG/1
3.12 TABLET ORAL 2 TIMES DAILY WITH MEALS
Qty: 180 TABLET | Refills: 1 | Status: SHIPPED | OUTPATIENT
Start: 2021-03-31 | End: 2022-01-11 | Stop reason: SDUPTHER

## 2021-04-14 ENCOUNTER — PATIENT OUTREACH (OUTPATIENT)
Dept: ADMINISTRATIVE | Facility: OTHER | Age: 66
End: 2021-04-14

## 2021-04-15 ENCOUNTER — OFFICE VISIT (OUTPATIENT)
Dept: OPTOMETRY | Facility: CLINIC | Age: 66
End: 2021-04-15
Payer: MEDICARE

## 2021-04-15 DIAGNOSIS — H25.13 NUCLEAR SCLEROSIS OF BOTH EYES: ICD-10-CM

## 2021-04-15 DIAGNOSIS — Z01.00 ROUTINE EYE EXAM: Primary | ICD-10-CM

## 2021-04-15 DIAGNOSIS — H52.7 REFRACTIVE ERROR: ICD-10-CM

## 2021-04-15 PROCEDURE — 92015 DETERMINE REFRACTIVE STATE: CPT | Mod: S$GLB,,, | Performed by: OPTOMETRIST

## 2021-04-15 PROCEDURE — 92004 PR EYE EXAM, NEW PATIENT,COMPREHESV: ICD-10-PCS | Mod: S$GLB,,, | Performed by: OPTOMETRIST

## 2021-04-15 PROCEDURE — 99999 PR PBB SHADOW E&M-EST. PATIENT-LVL II: ICD-10-PCS | Mod: PBBFAC,,, | Performed by: OPTOMETRIST

## 2021-04-15 PROCEDURE — 99499 UNLISTED E&M SERVICE: CPT | Mod: S$GLB,,, | Performed by: OPTOMETRIST

## 2021-04-15 PROCEDURE — 92015 PR REFRACTION: ICD-10-PCS | Mod: S$GLB,,, | Performed by: OPTOMETRIST

## 2021-04-15 PROCEDURE — 99999 PR PBB SHADOW E&M-EST. PATIENT-LVL II: CPT | Mod: PBBFAC,,, | Performed by: OPTOMETRIST

## 2021-04-15 PROCEDURE — 99499 RISK ADDL DX/OHS AUDIT: ICD-10-PCS | Mod: S$GLB,,, | Performed by: OPTOMETRIST

## 2021-04-15 PROCEDURE — 92004 COMPRE OPH EXAM NEW PT 1/>: CPT | Mod: S$GLB,,, | Performed by: OPTOMETRIST

## 2021-04-27 ENCOUNTER — TELEPHONE (OUTPATIENT)
Dept: SMOKING CESSATION | Facility: CLINIC | Age: 66
End: 2021-04-27

## 2021-04-29 RX ORDER — CLOPIDOGREL BISULFATE 75 MG/1
75 TABLET ORAL DAILY
Qty: 90 TABLET | Refills: 3 | Status: SHIPPED | OUTPATIENT
Start: 2021-04-29 | End: 2022-01-11 | Stop reason: ALTCHOICE

## 2021-05-05 ENCOUNTER — CLINICAL SUPPORT (OUTPATIENT)
Dept: SMOKING CESSATION | Facility: CLINIC | Age: 66
End: 2021-05-05
Payer: COMMERCIAL

## 2021-05-05 DIAGNOSIS — F17.200 NICOTINE DEPENDENCE: Primary | ICD-10-CM

## 2021-05-05 PROCEDURE — 99407 PR TOBACCO USE CESSATION INTENSIVE >10 MINUTES: ICD-10-PCS | Mod: S$GLB,,,

## 2021-05-05 PROCEDURE — 99407 BEHAV CHNG SMOKING > 10 MIN: CPT | Mod: S$GLB,,,

## 2021-09-16 ENCOUNTER — PES CALL (OUTPATIENT)
Dept: ADMINISTRATIVE | Facility: CLINIC | Age: 66
End: 2021-09-16

## 2021-10-13 ENCOUNTER — TELEPHONE (OUTPATIENT)
Dept: SMOKING CESSATION | Facility: CLINIC | Age: 66
End: 2021-10-13

## 2021-10-13 ENCOUNTER — CLINICAL SUPPORT (OUTPATIENT)
Dept: SMOKING CESSATION | Facility: CLINIC | Age: 66
End: 2021-10-13
Payer: COMMERCIAL

## 2021-10-13 DIAGNOSIS — F17.200 NICOTINE DEPENDENCE: Primary | ICD-10-CM

## 2021-10-13 PROCEDURE — 99407 BEHAV CHNG SMOKING > 10 MIN: CPT | Mod: S$GLB,,,

## 2021-10-13 PROCEDURE — 99407 PR TOBACCO USE CESSATION INTENSIVE >10 MINUTES: ICD-10-PCS | Mod: S$GLB,,,

## 2021-11-01 DIAGNOSIS — I10 ESSENTIAL HYPERTENSION: ICD-10-CM

## 2021-11-01 RX ORDER — LOSARTAN POTASSIUM 100 MG/1
TABLET ORAL
Qty: 90 TABLET | Refills: 3 | Status: SHIPPED | OUTPATIENT
Start: 2021-11-01 | End: 2022-01-11 | Stop reason: SDUPTHER

## 2021-12-10 ENCOUNTER — PATIENT OUTREACH (OUTPATIENT)
Dept: ADMINISTRATIVE | Facility: HOSPITAL | Age: 66
End: 2021-12-10
Payer: MEDICARE

## 2022-01-10 PROBLEM — I25.10 CORONARY ARTERY DISEASE INVOLVING NATIVE CORONARY ARTERY OF NATIVE HEART WITHOUT ANGINA PECTORIS: Status: ACTIVE | Noted: 2018-06-22

## 2022-01-11 ENCOUNTER — LAB VISIT (OUTPATIENT)
Dept: LAB | Facility: HOSPITAL | Age: 67
End: 2022-01-11
Attending: INTERNAL MEDICINE
Payer: MEDICARE

## 2022-01-11 ENCOUNTER — OFFICE VISIT (OUTPATIENT)
Dept: FAMILY MEDICINE | Facility: CLINIC | Age: 67
End: 2022-01-11
Payer: MEDICARE

## 2022-01-11 VITALS
SYSTOLIC BLOOD PRESSURE: 128 MMHG | TEMPERATURE: 98 F | BODY MASS INDEX: 27.89 KG/M2 | HEIGHT: 71 IN | DIASTOLIC BLOOD PRESSURE: 72 MMHG

## 2022-01-11 DIAGNOSIS — Z12.11 SCREENING FOR COLON CANCER: ICD-10-CM

## 2022-01-11 DIAGNOSIS — F17.200 SMOKER: ICD-10-CM

## 2022-01-11 DIAGNOSIS — R73.09 ABNORMAL GLUCOSE: ICD-10-CM

## 2022-01-11 DIAGNOSIS — I10 ESSENTIAL HYPERTENSION: ICD-10-CM

## 2022-01-11 DIAGNOSIS — Z00.00 NORMAL PHYSICAL EXAM: Primary | ICD-10-CM

## 2022-01-11 DIAGNOSIS — I77.810 DILATATION OF THORACIC AORTA: ICD-10-CM

## 2022-01-11 DIAGNOSIS — E78.00 PURE HYPERCHOLESTEROLEMIA: ICD-10-CM

## 2022-01-11 DIAGNOSIS — Z23 NEEDS FLU SHOT: ICD-10-CM

## 2022-01-11 DIAGNOSIS — I25.10 CORONARY ARTERY DISEASE INVOLVING NATIVE CORONARY ARTERY OF NATIVE HEART WITHOUT ANGINA PECTORIS: ICD-10-CM

## 2022-01-11 DIAGNOSIS — I50.22 CHRONIC SYSTOLIC HEART FAILURE: ICD-10-CM

## 2022-01-11 LAB
ALBUMIN SERPL BCP-MCNC: 3.6 G/DL (ref 3.5–5.2)
ALP SERPL-CCNC: 114 U/L (ref 55–135)
ALT SERPL W/O P-5'-P-CCNC: 14 U/L (ref 10–44)
ANION GAP SERPL CALC-SCNC: 11 MMOL/L (ref 8–16)
AST SERPL-CCNC: 16 U/L (ref 10–40)
BASOPHILS # BLD AUTO: 0.08 K/UL (ref 0–0.2)
BASOPHILS NFR BLD: 1.1 % (ref 0–1.9)
BILIRUB SERPL-MCNC: 0.9 MG/DL (ref 0.1–1)
BUN SERPL-MCNC: 8 MG/DL (ref 8–23)
CALCIUM SERPL-MCNC: 10.1 MG/DL (ref 8.7–10.5)
CHLORIDE SERPL-SCNC: 99 MMOL/L (ref 95–110)
CHOLEST SERPL-MCNC: 124 MG/DL (ref 120–199)
CHOLEST/HDLC SERPL: 3.4 {RATIO} (ref 2–5)
CO2 SERPL-SCNC: 25 MMOL/L (ref 23–29)
CREAT SERPL-MCNC: 0.7 MG/DL (ref 0.5–1.4)
DIFFERENTIAL METHOD: ABNORMAL
EOSINOPHIL # BLD AUTO: 0.3 K/UL (ref 0–0.5)
EOSINOPHIL NFR BLD: 3.5 % (ref 0–8)
ERYTHROCYTE [DISTWIDTH] IN BLOOD BY AUTOMATED COUNT: 14.1 % (ref 11.5–14.5)
EST. GFR  (AFRICAN AMERICAN): >60 ML/MIN/1.73 M^2
EST. GFR  (NON AFRICAN AMERICAN): >60 ML/MIN/1.73 M^2
ESTIMATED AVG GLUCOSE: 120 MG/DL (ref 68–131)
GLUCOSE SERPL-MCNC: 84 MG/DL (ref 70–110)
HBA1C MFR BLD: 5.8 % (ref 4–5.6)
HCT VFR BLD AUTO: 46.3 % (ref 40–54)
HDLC SERPL-MCNC: 37 MG/DL (ref 40–75)
HDLC SERPL: 29.8 % (ref 20–50)
HGB BLD-MCNC: 14.9 G/DL (ref 14–18)
IMM GRANULOCYTES # BLD AUTO: 0.04 K/UL (ref 0–0.04)
IMM GRANULOCYTES NFR BLD AUTO: 0.6 % (ref 0–0.5)
LDLC SERPL CALC-MCNC: 71.6 MG/DL (ref 63–159)
LYMPHOCYTES # BLD AUTO: 2 K/UL (ref 1–4.8)
LYMPHOCYTES NFR BLD: 27.3 % (ref 18–48)
MCH RBC QN AUTO: 31.6 PG (ref 27–31)
MCHC RBC AUTO-ENTMCNC: 32.2 G/DL (ref 32–36)
MCV RBC AUTO: 98 FL (ref 82–98)
MONOCYTES # BLD AUTO: 0.6 K/UL (ref 0.3–1)
MONOCYTES NFR BLD: 7.6 % (ref 4–15)
NEUTROPHILS # BLD AUTO: 4.3 K/UL (ref 1.8–7.7)
NEUTROPHILS NFR BLD: 59.9 % (ref 38–73)
NONHDLC SERPL-MCNC: 87 MG/DL
NRBC BLD-RTO: 0 /100 WBC
PLATELET # BLD AUTO: 262 K/UL (ref 150–450)
PMV BLD AUTO: 10 FL (ref 9.2–12.9)
POTASSIUM SERPL-SCNC: 3.8 MMOL/L (ref 3.5–5.1)
PROT SERPL-MCNC: 8.3 G/DL (ref 6–8.4)
RBC # BLD AUTO: 4.71 M/UL (ref 4.6–6.2)
SODIUM SERPL-SCNC: 135 MMOL/L (ref 136–145)
TRIGL SERPL-MCNC: 77 MG/DL (ref 30–150)
TSH SERPL DL<=0.005 MIU/L-ACNC: 2.16 UIU/ML (ref 0.4–4)
WBC # BLD AUTO: 7.21 K/UL (ref 3.9–12.7)

## 2022-01-11 PROCEDURE — 3078F DIAST BP <80 MM HG: CPT | Mod: HCNC,CPTII,S$GLB, | Performed by: INTERNAL MEDICINE

## 2022-01-11 PROCEDURE — 3288F FALL RISK ASSESSMENT DOCD: CPT | Mod: HCNC,CPTII,S$GLB, | Performed by: INTERNAL MEDICINE

## 2022-01-11 PROCEDURE — 36415 COLL VENOUS BLD VENIPUNCTURE: CPT | Mod: HCNC,PO | Performed by: INTERNAL MEDICINE

## 2022-01-11 PROCEDURE — 3074F SYST BP LT 130 MM HG: CPT | Mod: HCNC,CPTII,S$GLB, | Performed by: INTERNAL MEDICINE

## 2022-01-11 PROCEDURE — 83036 HEMOGLOBIN GLYCOSYLATED A1C: CPT | Mod: HCNC | Performed by: INTERNAL MEDICINE

## 2022-01-11 PROCEDURE — 1101F PT FALLS ASSESS-DOCD LE1/YR: CPT | Mod: HCNC,CPTII,S$GLB, | Performed by: INTERNAL MEDICINE

## 2022-01-11 PROCEDURE — 90694 FLU VACCINE - QUADRIVALENT - ADJUVANTED: ICD-10-PCS | Mod: HCNC,S$GLB,, | Performed by: INTERNAL MEDICINE

## 2022-01-11 PROCEDURE — 84443 ASSAY THYROID STIM HORMONE: CPT | Mod: HCNC | Performed by: INTERNAL MEDICINE

## 2022-01-11 PROCEDURE — 3008F PR BODY MASS INDEX (BMI) DOCUMENTED: ICD-10-PCS | Mod: HCNC,CPTII,S$GLB, | Performed by: INTERNAL MEDICINE

## 2022-01-11 PROCEDURE — 99499 RISK ADDL DX/OHS AUDIT: ICD-10-PCS | Mod: S$GLB,,, | Performed by: INTERNAL MEDICINE

## 2022-01-11 PROCEDURE — 1160F PR REVIEW ALL MEDS BY PRESCRIBER/CLIN PHARMACIST DOCUMENTED: ICD-10-PCS | Mod: HCNC,CPTII,S$GLB, | Performed by: INTERNAL MEDICINE

## 2022-01-11 PROCEDURE — 1126F PR PAIN SEVERITY QUANTIFIED, NO PAIN PRESENT: ICD-10-PCS | Mod: HCNC,CPTII,S$GLB, | Performed by: INTERNAL MEDICINE

## 2022-01-11 PROCEDURE — 99999 PR PBB SHADOW E&M-EST. PATIENT-LVL III: ICD-10-PCS | Mod: PBBFAC,HCNC,, | Performed by: INTERNAL MEDICINE

## 2022-01-11 PROCEDURE — 1126F AMNT PAIN NOTED NONE PRSNT: CPT | Mod: HCNC,CPTII,S$GLB, | Performed by: INTERNAL MEDICINE

## 2022-01-11 PROCEDURE — 99499 UNLISTED E&M SERVICE: CPT | Mod: S$GLB,,, | Performed by: INTERNAL MEDICINE

## 2022-01-11 PROCEDURE — G0008 FLU VACCINE - QUADRIVALENT - ADJUVANTED: ICD-10-PCS | Mod: HCNC,S$GLB,, | Performed by: INTERNAL MEDICINE

## 2022-01-11 PROCEDURE — 99999 PR PBB SHADOW E&M-EST. PATIENT-LVL III: CPT | Mod: PBBFAC,HCNC,, | Performed by: INTERNAL MEDICINE

## 2022-01-11 PROCEDURE — 3008F BODY MASS INDEX DOCD: CPT | Mod: HCNC,CPTII,S$GLB, | Performed by: INTERNAL MEDICINE

## 2022-01-11 PROCEDURE — 1160F RVW MEDS BY RX/DR IN RCRD: CPT | Mod: HCNC,CPTII,S$GLB, | Performed by: INTERNAL MEDICINE

## 2022-01-11 PROCEDURE — 1159F PR MEDICATION LIST DOCUMENTED IN MEDICAL RECORD: ICD-10-PCS | Mod: HCNC,CPTII,S$GLB, | Performed by: INTERNAL MEDICINE

## 2022-01-11 PROCEDURE — 1101F PR PT FALLS ASSESS DOC 0-1 FALLS W/OUT INJ PAST YR: ICD-10-PCS | Mod: HCNC,CPTII,S$GLB, | Performed by: INTERNAL MEDICINE

## 2022-01-11 PROCEDURE — 3288F PR FALLS RISK ASSESSMENT DOCUMENTED: ICD-10-PCS | Mod: HCNC,CPTII,S$GLB, | Performed by: INTERNAL MEDICINE

## 2022-01-11 PROCEDURE — 4010F ACE/ARB THERAPY RXD/TAKEN: CPT | Mod: HCNC,CPTII,S$GLB, | Performed by: INTERNAL MEDICINE

## 2022-01-11 PROCEDURE — 85025 COMPLETE CBC W/AUTO DIFF WBC: CPT | Mod: HCNC | Performed by: INTERNAL MEDICINE

## 2022-01-11 PROCEDURE — 80061 LIPID PANEL: CPT | Mod: HCNC | Performed by: INTERNAL MEDICINE

## 2022-01-11 PROCEDURE — G0008 ADMIN INFLUENZA VIRUS VAC: HCPCS | Mod: HCNC,S$GLB,, | Performed by: INTERNAL MEDICINE

## 2022-01-11 PROCEDURE — 4010F PR ACE/ARB THEARPY RXD/TAKEN: ICD-10-PCS | Mod: HCNC,CPTII,S$GLB, | Performed by: INTERNAL MEDICINE

## 2022-01-11 PROCEDURE — 3074F PR MOST RECENT SYSTOLIC BLOOD PRESSURE < 130 MM HG: ICD-10-PCS | Mod: HCNC,CPTII,S$GLB, | Performed by: INTERNAL MEDICINE

## 2022-01-11 PROCEDURE — 90694 VACC AIIV4 NO PRSRV 0.5ML IM: CPT | Mod: HCNC,S$GLB,, | Performed by: INTERNAL MEDICINE

## 2022-01-11 PROCEDURE — 80053 COMPREHEN METABOLIC PANEL: CPT | Mod: HCNC | Performed by: INTERNAL MEDICINE

## 2022-01-11 PROCEDURE — 1159F MED LIST DOCD IN RCRD: CPT | Mod: HCNC,CPTII,S$GLB, | Performed by: INTERNAL MEDICINE

## 2022-01-11 PROCEDURE — 99397 PER PM REEVAL EST PAT 65+ YR: CPT | Mod: HCNC,25,S$GLB, | Performed by: INTERNAL MEDICINE

## 2022-01-11 PROCEDURE — 99397 PR PREVENTIVE VISIT,EST,65 & OVER: ICD-10-PCS | Mod: HCNC,25,S$GLB, | Performed by: INTERNAL MEDICINE

## 2022-01-11 PROCEDURE — 3078F PR MOST RECENT DIASTOLIC BLOOD PRESSURE < 80 MM HG: ICD-10-PCS | Mod: HCNC,CPTII,S$GLB, | Performed by: INTERNAL MEDICINE

## 2022-01-11 RX ORDER — LOSARTAN POTASSIUM 100 MG/1
100 TABLET ORAL DAILY
Qty: 90 TABLET | Refills: 3 | Status: SHIPPED | OUTPATIENT
Start: 2022-01-11

## 2022-01-11 RX ORDER — ATORVASTATIN CALCIUM 20 MG/1
20 TABLET, FILM COATED ORAL DAILY
Qty: 90 TABLET | Refills: 3 | Status: SHIPPED | OUTPATIENT
Start: 2022-01-11

## 2022-01-11 RX ORDER — CARVEDILOL 3.12 MG/1
3.12 TABLET ORAL 2 TIMES DAILY WITH MEALS
Qty: 180 TABLET | Refills: 3 | Status: SHIPPED | OUTPATIENT
Start: 2022-01-11 | End: 2023-01-11

## 2022-01-11 RX ORDER — CLOPIDOGREL BISULFATE 75 MG/1
75 TABLET ORAL DAILY
Qty: 90 TABLET | Refills: 3 | Status: CANCELLED | OUTPATIENT
Start: 2022-01-11 | End: 2023-01-11

## 2022-01-11 NOTE — PROGRESS NOTES
This note was created by combination of typed  and M-Modal dictation.  Transcription errors may be present.  If there are any questions, please contact me.    Assessment and Plan:   Normal physical exam  Screening for colon cancer  -check labs  Ok not to test PSA  Colonoscopy ordered  -     CBC Auto Differential; Future; Expected date: 01/11/2023  -     Comprehensive Metabolic Panel; Future; Expected date: 01/11/2023  -     Lipid Panel; Future; Expected date: 01/11/2023  -     Case Request Endoscopy: COLONOSCOPY    Coronary artery disease involving native coronary artery of native heart without angina pectoris  Pure hypercholesterolemia  -check labs on statin  CAD needs follow up with cardiology, our staff to schedule  -     atorvastatin (LIPITOR) 20 MG tablet; Take 1 tablet (20 mg total) by mouth once daily.  Dispense: 90 tablet; Refill: 3  -     CBC Auto Differential; Future; Expected date: 01/11/2023  -     Comprehensive Metabolic Panel; Future; Expected date: 01/11/2023  -     Lipid Panel; Future; Expected date: 01/11/2023    Essential hypertension  Chronic systolic heart failure 6/2018 TTE LVEF 30-35%; improved LVEF 10/2020  -stable BP, refilled, follow up with cardiology  Last TTE improved LVEF  -     carvediloL (COREG) 3.125 MG tablet; Take 1 tablet (3.125 mg total) by mouth 2 (two) times daily with meals.  Dispense: 180 tablet; Refill: 3  -     losartan (COZAAR) 100 MG tablet; Take 1 tablet (100 mg total) by mouth once daily.  Dispense: 90 tablet; Refill: 3    Dilatation of thoracic aorta  -overdue for CT chest and f/u with CT surgery. Will message CT surgery staff    Abnormal glucose  -check a1c last check 8/2020 5.9     Smoker  -refer back to smoking cessation.  1/2 PPD  -     Ambulatory referral/consult to Smoking Cessation Program; Future; Expected date: 01/18/2022    Needs flu shot  -     Influenza - Quadrivalent (Adjuvanted)    The natural history of prostate cancer and ongoing controversy  regarding screening and potential treatment outcomes of prostate cancer has been discussed with the patient. The meaning of a false positive PSA and a false negative PSA has been discussed. He indicates understanding of the limitations of this screening test and wishes NOT to proceed with screening PSA testing.        Medications Discontinued During This Encounter   Medication Reason    clopidogreL (PLAVIX) 75 mg tablet Alternate therapy    carvediloL (COREG) 3.125 MG tablet Reorder    atorvastatin (LIPITOR) 20 MG tablet Reorder    losartan (COZAAR) 100 MG tablet Reorder       meds sent this encounter:  Medications Ordered This Encounter   Medications    atorvastatin (LIPITOR) 20 MG tablet     Sig: Take 1 tablet (20 mg total) by mouth once daily.     Dispense:  90 tablet     Refill:  3    carvediloL (COREG) 3.125 MG tablet     Sig: Take 1 tablet (3.125 mg total) by mouth 2 (two) times daily with meals.     Dispense:  180 tablet     Refill:  3     .    losartan (COZAAR) 100 MG tablet     Sig: Take 1 tablet (100 mg total) by mouth once daily.     Dispense:  90 tablet     Refill:  3     .       Follow Up: No follow-ups on file. PEx 1 year. In the interim to see cardiology and CT surgery    Subjective:     Chief Complaint   Patient presents with    Medication Refill    Hypertension       SERGE Miranda is a 66 y.o. male, last appointment with this clinic was Visit date not found.  Social History     Tobacco Use    Smoking status: Former Smoker     Packs/day: 1.00     Years: 50.00     Pack years: 50.00     Types: Cigarettes     Quit date: 2017     Years since quittin.9    Smokeless tobacco: Current User   Substance Use Topics    Alcohol use: Yes     Alcohol/week: 14.0 standard drinks     Types: 14 Cans of beer per week      Social History     Occupational History    Occupation: guard at the [employer]      Social History     Social History Narrative    Not on file       Last visit with me  8/2020  HTN, lipid, CAD. If BP high consider diuretic. Stop plavix stay on ASA  HFrEF  Abn glc  Smoker rx'd spiriva    CT lung neg  AAA screening negative.  Borderline ascending thoracic aorta dilatation 5.0 cm. That was seen on CT.  Results to patient. Repeat CT lung screening 1 year.    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    Restarted smoking, half pack a day.  Lost to follow-up with smoking cessation would be agreeable to restart.    Denies chest pain or shortness of breath.  Overdue for follow-up with Cardiology.  Because he travels frequently and spreading to travel back to [place] he is requesting follow-up to be scheduled for 3 months from now.  Reports compliance on medications.  Not taking Plavix.  Is taking aspirin.    No urinary complaints.  No blood in urine, no pain    Hypertension, wife checks his blood pressure and reports that is fine, does not have any recall of any specific numbers.  He does not feel comfortable operating smart phone and declines digital monitoring    He is due for repeat CT chest and follow-up with CT surgery.  He is asking to defer this for 3 months because of travel      Due for colonoscopy    Patient Care Team:  Charles Hwang MD as PCP - General (Internal Medicine)  Shailesh Grgeg MA as Care Coordinator  Kranthi Engel MD as Referring Physician (Cardiology)    Patient Active Problem List    Diagnosis Date Noted    Nuclear sclerosis of both eyes 04/15/2021    Refractive error 04/15/2021    Dilatation of thoracic  aorta on CT 9/2020, borderline; seen by CT surgery; CTA cardiac 9/2021 09/22/2020 9/22/20 CT lung screening:  Borderline aneurysmal dilatation of the ascending thoracic aorta measuring 5.0 cm in diameter.      Coronary artery disease involving native coronary artery of native heart without angina pectoris s/p JOSÉ LAD 6/2018; Occluded RCA with some left to right collaterals. 06/22/2018 6/21/18 LHC - LAD mid 60%, D1 small 90% proximal, Cx mild diffuse disease, % proximal - distal vessel fills with some left to right collaterals. Attempted RHC - right % before IVC  IFR guided PCI of the LAD  Proximal 70-80% lesion which was eccentric and hazy.  IFR was equal to 0.86.  Then placed a 3 x 22 mm resolute drug-eluting stent.  We post dilated the proximal portion with a 3.5 x 12 mm noncompliant balloon.  Good result was achieved with NOMI-3 flow through the vessel.     10/2/2020 nu med stress test shows abnormal myocardial perfusion.  Abnormal myocardial perfusion study. Perfusion Defect There is a mild intensity, fixed defect consistent with scar  in the inferior wall(s). Gated perfusion images showed an ejection fraction of 45% post stress.        Chronic systolic heart failure 6/2018 TTE LVEF 30-35%; improved LVEF 10/2020 06/04/2018     10/2/2020 TTE With normal systolic function. The estimated ejection fraction is 55%. Normal left ventricular diastolic function. Normal right ventricular systolic function. Mild aortic valve stenosis. Aortic valve area is 1.47 cm2; peak velocity is 2.45 m/s; mean gradient is 14 mmHg.      Nonrheumatic aortic valve stenosis; mild stenosis on TTE 6/2018 06/04/2018    Abnormal glucose 06/08/2017    Former smoker quit 6/2018 02/07/2017    Pure hypercholesterolemia 09/19/2012    Essential hypertension 08/06/2012     10/2/2020 TTE With normal systolic function. The estimated ejection fraction is 55%. Normal left ventricular diastolic function. Normal right ventricular  "systolic function. Mild aortic valve stenosis. Aortic valve area is 1.47 cm2; peak velocity is 2.45 m/s; mean gradient is 14 mmHg.             PAST MEDICAL PROBLEMS, PAST SURGICAL HISTORY: please see relevant portions of the electronic medical record    ALLERGIES AND MEDICATIONS: updated and reviewed.  Review of patient's allergies indicates:   Allergen Reactions    Penicillins      Other reaction(s): Hives,itching       Medication List with Changes/Refills   Current Medications    ALBUTEROL (PROVENTIL/VENTOLIN HFA) 90 MCG/ACTUATION INHALER    INHALE 2 PUFFS INTO THE LUNGS EVERY 6 HOURS AS NEEDED FOR WHEEZING    ASPIRIN (ECOTRIN) 81 MG EC TABLET    Take 1 tablet (81 mg total) by mouth once daily.    ATORVASTATIN (LIPITOR) 20 MG TABLET    TAKE 1 TABLET(20 MG) BY MOUTH EVERY DAY    CARVEDILOL (COREG) 3.125 MG TABLET    Take 1 tablet (3.125 mg total) by mouth 2 (two) times daily with meals.    CLOPIDOGREL (PLAVIX) 75 MG TABLET    Take 1 tablet (75 mg total) by mouth once daily.    LOSARTAN (COZAAR) 100 MG TABLET    TAKE 1 TABLET(100 MG) BY MOUTH EVERY DAY    TIOTROPIUM (SPIRIVA) 18 MCG INHALATION CAPSULE    Inhale 1 capsule (18 mcg total) into the lungs once daily. Controller; take every day        Objective:   Physical Exam   Vitals:    01/11/22 1447   BP: 128/72   BP Location: Left arm   Patient Position: Sitting   BP Method: Medium (Manual)   Temp: 97.7 °F (36.5 °C)   TempSrc: Oral   Height: 5' 11" (1.803 m)    Body mass index is 27.89 kg/m².      Height: 5' 11" (180.3 cm)     Physical Exam  Constitutional:       Appearance: Normal appearance. He is well-developed.   HENT:      Right Ear: Tympanic membrane and external ear normal.      Left Ear: Tympanic membrane and external ear normal.      Nose: Nose normal.   Eyes:      General: No scleral icterus.     Conjunctiva/sclera: Conjunctivae normal.   Neck:      Thyroid: No thyroid mass or thyromegaly.      Trachea: Trachea normal.   Cardiovascular:      Rate and " Rhythm: Normal rate and regular rhythm.      Heart sounds: Normal heart sounds, S1 normal and S2 normal. No murmur heard.      Pulmonary:      Effort: Pulmonary effort is normal.      Breath sounds: Normal breath sounds.   Abdominal:      General: There is no distension.      Palpations: Abdomen is soft. There is no hepatomegaly, splenomegaly or mass.      Tenderness: There is no abdominal tenderness.   Musculoskeletal:         General: No deformity.      Right lower leg: No edema.      Left lower leg: No edema.   Lymphadenopathy:      Cervical: No cervical adenopathy.   Skin:     General: Skin is warm and dry.      Findings: No rash (on exposed skin).      Comments: On exposed skin   Neurological:      Mental Status: He is alert and oriented to person, place, and time.      Cranial Nerves: No cranial nerve deficit.      Sensory: No sensory deficit.      Deep Tendon Reflexes: Reflexes are normal and symmetric.   Psychiatric:         Speech: Speech normal.         Behavior: Behavior normal.         Thought Content: Thought content normal.         Judgment: Judgment normal.

## 2022-01-12 DIAGNOSIS — I20.89 OTHER FORMS OF ANGINA PECTORIS: Primary | ICD-10-CM

## 2022-01-12 NOTE — PROGRESS NOTES
CMP mild low Na  CBC WNL  A1c preDM 5.8 from 5.9  TSH WNL  Lipid good on statin    Results to pt via MyChart.  PEx 1 year.   Referred back to smoking cessation  Messaged CT surgery staff to assist with arranging f/u

## 2022-01-18 ENCOUNTER — HOSPITAL ENCOUNTER (OUTPATIENT)
Dept: RADIOLOGY | Facility: HOSPITAL | Age: 67
Discharge: HOME | End: 2022-01-18
Attending: THORACIC SURGERY (CARDIOTHORACIC VASCULAR SURGERY)
Payer: MEDICARE

## 2022-01-18 DIAGNOSIS — I20.89 OTHER FORMS OF ANGINA PECTORIS: ICD-10-CM

## 2022-01-18 PROCEDURE — 25500020 PHARM REV CODE 255: Mod: HCNC | Performed by: THORACIC SURGERY (CARDIOTHORACIC VASCULAR SURGERY)

## 2022-01-18 PROCEDURE — 71275 CT ANGIOGRAPHY CHEST: CPT | Mod: 26,HCNC,, | Performed by: RADIOLOGY

## 2022-01-18 PROCEDURE — 71275 CT ANGIOGRAPHY CHEST: CPT | Mod: TC,HCNC

## 2022-01-18 PROCEDURE — 71275 CTA PORTICO TAVR: ICD-10-PCS | Mod: 26,HCNC,, | Performed by: RADIOLOGY

## 2022-01-18 RX ADMIN — IOHEXOL 100 ML: 350 INJECTION, SOLUTION INTRAVENOUS at 10:01

## 2022-01-19 ENCOUNTER — OFFICE VISIT (OUTPATIENT)
Dept: CARDIOTHORACIC SURGERY | Facility: CLINIC | Age: 67
End: 2022-01-19
Payer: MEDICARE

## 2022-01-19 VITALS
BODY MASS INDEX: 26.74 KG/M2 | HEIGHT: 71 IN | WEIGHT: 191 LBS | TEMPERATURE: 98 F | HEART RATE: 90 BPM | DIASTOLIC BLOOD PRESSURE: 72 MMHG | SYSTOLIC BLOOD PRESSURE: 159 MMHG

## 2022-01-19 DIAGNOSIS — I77.810 DILATATION OF THORACIC AORTA: Primary | ICD-10-CM

## 2022-01-19 PROCEDURE — 1159F PR MEDICATION LIST DOCUMENTED IN MEDICAL RECORD: ICD-10-PCS | Mod: HCNC,CPTII,S$GLB, | Performed by: THORACIC SURGERY (CARDIOTHORACIC VASCULAR SURGERY)

## 2022-01-19 PROCEDURE — 99999 PR PBB SHADOW E&M-EST. PATIENT-LVL III: CPT | Mod: PBBFAC,HCNC,, | Performed by: THORACIC SURGERY (CARDIOTHORACIC VASCULAR SURGERY)

## 2022-01-19 PROCEDURE — 99499 UNLISTED E&M SERVICE: CPT | Mod: S$GLB,,, | Performed by: THORACIC SURGERY (CARDIOTHORACIC VASCULAR SURGERY)

## 2022-01-19 PROCEDURE — 3077F SYST BP >= 140 MM HG: CPT | Mod: HCNC,CPTII,S$GLB, | Performed by: THORACIC SURGERY (CARDIOTHORACIC VASCULAR SURGERY)

## 2022-01-19 PROCEDURE — 3078F PR MOST RECENT DIASTOLIC BLOOD PRESSURE < 80 MM HG: ICD-10-PCS | Mod: HCNC,CPTII,S$GLB, | Performed by: THORACIC SURGERY (CARDIOTHORACIC VASCULAR SURGERY)

## 2022-01-19 PROCEDURE — 99999 PR PBB SHADOW E&M-EST. PATIENT-LVL III: ICD-10-PCS | Mod: PBBFAC,HCNC,, | Performed by: THORACIC SURGERY (CARDIOTHORACIC VASCULAR SURGERY)

## 2022-01-19 PROCEDURE — 3008F BODY MASS INDEX DOCD: CPT | Mod: HCNC,CPTII,S$GLB, | Performed by: THORACIC SURGERY (CARDIOTHORACIC VASCULAR SURGERY)

## 2022-01-19 PROCEDURE — 4010F PR ACE/ARB THEARPY RXD/TAKEN: ICD-10-PCS | Mod: HCNC,CPTII,S$GLB, | Performed by: THORACIC SURGERY (CARDIOTHORACIC VASCULAR SURGERY)

## 2022-01-19 PROCEDURE — 1159F MED LIST DOCD IN RCRD: CPT | Mod: HCNC,CPTII,S$GLB, | Performed by: THORACIC SURGERY (CARDIOTHORACIC VASCULAR SURGERY)

## 2022-01-19 PROCEDURE — 4010F ACE/ARB THERAPY RXD/TAKEN: CPT | Mod: HCNC,CPTII,S$GLB, | Performed by: THORACIC SURGERY (CARDIOTHORACIC VASCULAR SURGERY)

## 2022-01-19 PROCEDURE — 1101F PT FALLS ASSESS-DOCD LE1/YR: CPT | Mod: HCNC,CPTII,S$GLB, | Performed by: THORACIC SURGERY (CARDIOTHORACIC VASCULAR SURGERY)

## 2022-01-19 PROCEDURE — 3044F PR MOST RECENT HEMOGLOBIN A1C LEVEL <7.0%: ICD-10-PCS | Mod: HCNC,CPTII,S$GLB, | Performed by: THORACIC SURGERY (CARDIOTHORACIC VASCULAR SURGERY)

## 2022-01-19 PROCEDURE — 99215 OFFICE O/P EST HI 40 MIN: CPT | Mod: HCNC,S$GLB,, | Performed by: THORACIC SURGERY (CARDIOTHORACIC VASCULAR SURGERY)

## 2022-01-19 PROCEDURE — 1101F PR PT FALLS ASSESS DOC 0-1 FALLS W/OUT INJ PAST YR: ICD-10-PCS | Mod: HCNC,CPTII,S$GLB, | Performed by: THORACIC SURGERY (CARDIOTHORACIC VASCULAR SURGERY)

## 2022-01-19 PROCEDURE — 3044F HG A1C LEVEL LT 7.0%: CPT | Mod: HCNC,CPTII,S$GLB, | Performed by: THORACIC SURGERY (CARDIOTHORACIC VASCULAR SURGERY)

## 2022-01-19 PROCEDURE — 1126F AMNT PAIN NOTED NONE PRSNT: CPT | Mod: HCNC,CPTII,S$GLB, | Performed by: THORACIC SURGERY (CARDIOTHORACIC VASCULAR SURGERY)

## 2022-01-19 PROCEDURE — 3008F PR BODY MASS INDEX (BMI) DOCUMENTED: ICD-10-PCS | Mod: HCNC,CPTII,S$GLB, | Performed by: THORACIC SURGERY (CARDIOTHORACIC VASCULAR SURGERY)

## 2022-01-19 PROCEDURE — 3288F PR FALLS RISK ASSESSMENT DOCUMENTED: ICD-10-PCS | Mod: HCNC,CPTII,S$GLB, | Performed by: THORACIC SURGERY (CARDIOTHORACIC VASCULAR SURGERY)

## 2022-01-19 PROCEDURE — 3288F FALL RISK ASSESSMENT DOCD: CPT | Mod: HCNC,CPTII,S$GLB, | Performed by: THORACIC SURGERY (CARDIOTHORACIC VASCULAR SURGERY)

## 2022-01-19 PROCEDURE — 99499 RISK ADDL DX/OHS AUDIT: ICD-10-PCS | Mod: S$GLB,,, | Performed by: THORACIC SURGERY (CARDIOTHORACIC VASCULAR SURGERY)

## 2022-01-19 PROCEDURE — 3078F DIAST BP <80 MM HG: CPT | Mod: HCNC,CPTII,S$GLB, | Performed by: THORACIC SURGERY (CARDIOTHORACIC VASCULAR SURGERY)

## 2022-01-19 PROCEDURE — 1126F PR PAIN SEVERITY QUANTIFIED, NO PAIN PRESENT: ICD-10-PCS | Mod: HCNC,CPTII,S$GLB, | Performed by: THORACIC SURGERY (CARDIOTHORACIC VASCULAR SURGERY)

## 2022-01-19 PROCEDURE — 3077F PR MOST RECENT SYSTOLIC BLOOD PRESSURE >= 140 MM HG: ICD-10-PCS | Mod: HCNC,CPTII,S$GLB, | Performed by: THORACIC SURGERY (CARDIOTHORACIC VASCULAR SURGERY)

## 2022-01-19 PROCEDURE — 99215 PR OFFICE/OUTPT VISIT, EST, LEVL V, 40-54 MIN: ICD-10-PCS | Mod: HCNC,S$GLB,, | Performed by: THORACIC SURGERY (CARDIOTHORACIC VASCULAR SURGERY)

## 2022-01-19 NOTE — PROGRESS NOTES
Subjective:      Patient ID: Ruben Cardenas is a 66 y.o. male.    Chief Complaint: No chief complaint on file.      HPI:  Ruben Cardenas is a 66 y.o. male who presents as a follow up for TAA.  His medical history is significant for hyperlipidemia, hypertension, and tobacco abuse (current everyday smoker, 1-2 ppd smoker for 55 years)He presented in November 2020 for same complaint.  At that time, his TAA on CT chest noncontrast shows a 4.5cm ascending aorta at the level of the right pulmonary artery which increases to 5.0cm at the superior portion of the ascending aorta and proximal aortic arch (origin of the innominate artery) then decreases to 3.0cm at the distal arch, as well as severe aortic arch calcifications and mild ascending aortic calcifications. CTA chest showed a 3.6cm ascending aorta (with very thickened walls) at the level of the right pulmonary artery which increases to a 4.1cm saccular aneurysm at the superior portion of the ascending aorta and proximal aortic arch then decreases to 3.0cm at the distal arch.  Since his last visit, he states he has decreased the amount he normally smokes and reports that he is down to 0.5-1 PPD.  He does not take his blood pressure at home but states when he goes to the doctor they always tell him that his blood pressure is good.  He denies any complaints during the visit.       Current Outpatient Medications:     albuterol (PROVENTIL/VENTOLIN HFA) 90 mcg/actuation inhaler, INHALE 2 PUFFS INTO THE LUNGS EVERY 6 HOURS AS NEEDED FOR WHEEZING, Disp: 54 g, Rfl: 1    aspirin (ECOTRIN) 81 MG EC tablet, Take 1 tablet (81 mg total) by mouth once daily., Disp: , Rfl: 0    atorvastatin (LIPITOR) 20 MG tablet, Take 1 tablet (20 mg total) by mouth once daily., Disp: 90 tablet, Rfl: 3    carvediloL (COREG) 3.125 MG tablet, Take 1 tablet (3.125 mg total) by mouth 2 (two) times daily with meals., Disp: 180 tablet, Rfl: 3    losartan (COZAAR) 100 MG tablet, Take 1 tablet (100  mg total) by mouth once daily., Disp: 90 tablet, Rfl: 3    tiotropium (SPIRIVA) 18 mcg inhalation capsule, Inhale 1 capsule (18 mcg total) into the lungs once daily. Controller; take every day, Disp: 30 capsule, Rfl: 11  No current facility-administered medications for this visit.  Current medications Reviewed    Review of Systems   Constitutional: Negative for activity change, appetite change, fatigue and fever.   HENT: Negative for nosebleeds.    Respiratory: Negative for cough and shortness of breath.    Cardiovascular: Negative for chest pain, palpitations and leg swelling.   Gastrointestinal: Negative for abdominal distention, abdominal pain and nausea.   Genitourinary: Negative for frequency.   Musculoskeletal: Negative for arthralgias and myalgias.   Skin: Negative for rash.   Neurological: Negative for dizziness and numbness.   Hematological: Does not bruise/bleed easily.     Objective:   Physical Exam  HENT:      Head: Normocephalic and atraumatic.   Eyes:      Extraocular Movements: Extraocular movements intact.   Cardiovascular:      Rate and Rhythm: Normal rate and regular rhythm.   Pulmonary:      Effort: Pulmonary effort is normal.   Abdominal:      General: Abdomen is flat.      Palpations: Abdomen is soft.   Musculoskeletal:         General: Normal range of motion.      Cervical back: Normal range of motion.   Skin:     General: Skin is warm and dry.      Capillary Refill: Capillary refill takes less than 2 seconds.   Neurological:      General: No focal deficit present.       Diagnotic Results: Reviewed  1/18/2022  Impression:  1. Extensive atherosclerosis throughout the bilateral lower extremity arterial system, noting high-grade stenosis versus near occlusion of the bilateral common femoral arteries.  Cardiac TAVR measurements as above.  2. 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  3. Aortic and  multivessel coronary artery calcification.  Significant stenosis origin of the right subclavian artery and possibly origin of the left vertebral artery.  Additional findings as above.  4. Probable ascending aortic ectasia/aneurysm with measurements as above.  5.  This report was flagged in Epic as abnormal.    ECHO 10/2/2020   · With normal systolic function. The estimated ejection fraction is 55%.  · Normal left ventricular diastolic function.  · Normal right ventricular systolic function.  · Mild aortic valve stenosis.  · Aortic valve area is 1.47 cm2; peak velocity is 2.45 m/s; mean gradient is 14 mmHg.  · Sinus 3.7    CTA Chest 11/4/2020  Aorta:  -Left-sided aortic arch.  -There are 3 arch branches with calcification in the proximal portions of those arch branch vessels including at their origins.  -There is three-vessel coronary artery calcification.  -There is calcification in aortic valve leaflets and aortic valve annulus.  Mid and distal ascending aorta has no calcification in its anterior or lateral walls.  -There is mural thickening along the medial, lateral and posterior walls of the proximal, mid and distal ascending aorta as well as the circumferential wall of the aortic arch and the origins of arch branch vessels. Radiographic appearance is compatible with soft plaque versus mural thrombus (see axial series 2 images 233 through 125).  Although I detect no ulceration, there is a saccular aneurysm at the underside of the distal ascending aorta and proximal aortic arch discussed further below.  There is also evidence of mural thrombus in the posterior aspect of the distal descending aorta, discussed below.  -There is calcification at the medial and lateral walls of the aortic arch including the underside of the arch in this patient with a rather posterior origin of the left subclavian artery, and calcification in the descending thoracic aorta.  Measurements and description of the aortic lumen:  Prox AAo =  2.8 cm.  (cor  image #189).  Mid AAo at the level of the RPA = 3.6 x 3.5 cm.  (cor  image #183); sagittal series 602, image 269.  There is a shallow saccular aneurysm arising from the posterior wall of the distal ascending aorta extending along the underside of the arch.  Luminal measurement includes that aneurysm:  Distal AAo= 3.7 by 4.1 cm.  (sag  image # 269; coronal series 601, image 205).  Anterior aortic arch below the origins of the right innominate and left common carotid arteries  = 3.6 cm.  (sag  image # 276).  Mid aortic arch between LCC and LSA origins = 2.7 cm.  (sag  image # 278).  Isthmus = 2.4 cm.  (sag  image # 274).  Prox D'g Ao = 2.5 cm.  (sag  image # 269).  Mid D'g Ao at the level of the left atrium = 2.7 cm.  (sag  image # 269).  Distal D'g Ao  Shallow ulcers are present in the wall of the distal descending aorta, well seen along the posterior wall on sagittal series 602, image 275 and along the anterior wall on axial series 2, image 432.  Measurement of the distal descending aortic caliber = 2.3 cm.  (sag  image # 275).  Irregularity of the posterior aspect of the lumen of the distal descending aorta suggest mural thrombus (axial series 2, image 457).  Assessment:   1. TAA  Plan:   CTA and follow up appointment in one year      I have seen the patient and reviewed the nurse practitioner's note above. I have personally interviewed and examined the patient at bedside and agree with the findings.     Mr. Cardenas is a 65 year-old male smoker (1ppd x 55 years, now a few cigarettes daily) with a history of coronary artery disease s/p stent to LAD (2018) and known totally occluded RCA with inferior wall scar, heart failure reduced ejection fraction due to ischemic cardiomyopathy, ascending aortic aneurysm, and hypertension,.  He denies any cardiac symptoms, stating he has no chest pain, no new back pain, and no dyspnea on exertion.      CTA chest  (January 2022) shows similar findings from the November 2020 CTA chest, which is a 3.6cm ascending aorta (with very thickened walls) at the level of the right pulmonary artery which increases to a 4.1cm saccular aneurysm at the superior portion of the ascending aorta and proximal aortic arch then decreases to 3.0cm at the distal arch.     We had an extensive discussion with him regarding the ACC/AHA guidelines and we agreed that we should continue to monitor his aortic aneurysm with yearly CTA chest scans.  Additionally, we had a lengthy talk about smoking cessation.  He will see me again in one year, but if he develops symptoms he will see me immediately.      Federico Ramirez MD  Cardiothoracic Surgery  [facility]

## 2022-04-05 ENCOUNTER — TELEPHONE (OUTPATIENT)
Dept: FAMILY MEDICINE | Facility: CLINIC | Age: 67
End: 2022-04-05
Payer: MEDICARE

## 2022-04-05 NOTE — TELEPHONE ENCOUNTER
----- Message from Chelsea Thorne sent at 4/5/2022  8:37 AM CDT -----  Regarding: patient passed away on past Sunday  Type: Patient Call Back    Who called:Lacie (spouse)     What is the request in detail: Lacie, spouse of the patient, called to notify Dr. Hwang that the patient passed away on the past Sunday. She can be reached at the number below.     Can the clinic reply by [facility]?no    Would the patient rather a call back or a response via [facility]? Call back     Best call back number:[phone]